# Patient Record
Sex: MALE | Race: WHITE | Employment: UNEMPLOYED | URBAN - METROPOLITAN AREA
[De-identification: names, ages, dates, MRNs, and addresses within clinical notes are randomized per-mention and may not be internally consistent; named-entity substitution may affect disease eponyms.]

---

## 2023-11-03 DIAGNOSIS — R21 RASH: Primary | ICD-10-CM

## 2023-11-03 RX ORDER — CLOTRIMAZOLE AND BETAMETHASONE DIPROPIONATE 10; .64 MG/G; MG/G
CREAM TOPICAL
Qty: 15 G | Refills: 1 | Status: SHIPPED | OUTPATIENT
Start: 2023-11-03

## 2023-11-13 DIAGNOSIS — N52.9 ERECTILE DYSFUNCTION, UNSPECIFIED ERECTILE DYSFUNCTION TYPE: ICD-10-CM

## 2023-11-13 DIAGNOSIS — G47.00 INSOMNIA, UNSPECIFIED: ICD-10-CM

## 2023-11-13 RX ORDER — ZOLPIDEM TARTRATE 10 MG/1
10 TABLET ORAL NIGHTLY
Qty: 30 TABLET | Refills: 2 | Status: SHIPPED | OUTPATIENT
Start: 2023-11-13 | End: 2024-02-15

## 2023-11-13 RX ORDER — SILDENAFIL 100 MG/1
TABLET, FILM COATED ORAL
Qty: 5 TABLET | Refills: 5 | Status: SHIPPED | OUTPATIENT
Start: 2023-11-13

## 2023-11-13 NOTE — TELEPHONE ENCOUNTER
Rx Refill Request Telephone Encounter    Name:  Kamari Zhao  :  [id number]  Medication Name: Zolpidem 10 mg            Specific Pharmacy location:   [facility]  Date of last appointment:    Date of next appointment:    Best number to reach patient:

## 2023-12-01 DIAGNOSIS — I10 HYPERTENSION, UNSPECIFIED TYPE: ICD-10-CM

## 2023-12-01 RX ORDER — HYDROCHLOROTHIAZIDE 50 MG/1
TABLET ORAL
COMMUNITY
Start: 2023-07-09 | End: 2023-12-28

## 2023-12-01 RX ORDER — METOPROLOL TARTRATE 100 MG/1
100 TABLET ORAL 2 TIMES DAILY
COMMUNITY
End: 2023-12-12 | Stop reason: SDUPTHER

## 2023-12-01 RX ORDER — HYDROCODONE BITARTRATE AND ACETAMINOPHEN 5; 325 MG/1; MG/1
TABLET ORAL EVERY 6 HOURS
COMMUNITY

## 2023-12-01 RX ORDER — ONDANSETRON 4 MG/1
TABLET, ORALLY DISINTEGRATING ORAL
COMMUNITY
Start: 2023-01-16

## 2023-12-01 RX ORDER — LOVASTATIN 10 MG/1
10 TABLET ORAL DAILY
COMMUNITY
End: 2024-02-29

## 2023-12-01 RX ORDER — METOPROLOL TARTRATE 100 MG/1
100 TABLET ORAL 2 TIMES DAILY
Qty: 180 TABLET | Refills: 2 | Status: SHIPPED | OUTPATIENT
Start: 2023-12-01

## 2023-12-01 RX ORDER — OXYCODONE HYDROCHLORIDE 5 MG/1
5 TABLET ORAL EVERY 6 HOURS PRN
COMMUNITY
Start: 2023-07-15

## 2023-12-04 ENCOUNTER — TELEPHONE (OUTPATIENT)
Dept: PRIMARY CARE | Facility: CLINIC | Age: 68
End: 2023-12-04
Payer: COMMERCIAL

## 2023-12-04 DIAGNOSIS — F32.A ANXIETY AND DEPRESSION: ICD-10-CM

## 2023-12-04 DIAGNOSIS — F41.9 ANXIETY AND DEPRESSION: ICD-10-CM

## 2023-12-04 RX ORDER — CLONAZEPAM 1 MG/1
1 TABLET ORAL 2 TIMES DAILY
COMMUNITY
End: 2023-12-04 | Stop reason: SDUPTHER

## 2023-12-04 RX ORDER — CLONAZEPAM 1 MG/1
1 TABLET ORAL 2 TIMES DAILY
Qty: 60 TABLET | Refills: 0 | Status: SHIPPED | OUTPATIENT
Start: 2023-12-04 | End: 2024-01-04

## 2023-12-04 NOTE — TELEPHONE ENCOUNTER
RX REFILL  Clonazepam 1 mg twice daily  Pharmacy- [facility] in [place] on [facility]

## 2023-12-04 NOTE — TELEPHONE ENCOUNTER
Rx refill  Clonazepam 1 mg take one tablet by mouth at bedtime  Pharmacy- [facility] [place] on [facility]

## 2023-12-06 ENCOUNTER — LAB (OUTPATIENT)
Dept: LAB | Facility: LAB | Age: 68
End: 2023-12-06
Payer: COMMERCIAL

## 2023-12-06 DIAGNOSIS — E11.9 TYPE 2 DIABETES MELLITUS WITHOUT COMPLICATIONS (MULTI): ICD-10-CM

## 2023-12-06 DIAGNOSIS — I10 ESSENTIAL (PRIMARY) HYPERTENSION: ICD-10-CM

## 2023-12-06 DIAGNOSIS — Z00.00 ENCOUNTER FOR GENERAL ADULT MEDICAL EXAMINATION WITHOUT ABNORMAL FINDINGS: Primary | ICD-10-CM

## 2023-12-06 DIAGNOSIS — Z12.5 ENCOUNTER FOR SCREENING FOR MALIGNANT NEOPLASM OF PROSTATE: ICD-10-CM

## 2023-12-06 DIAGNOSIS — E78.00 PURE HYPERCHOLESTEROLEMIA, UNSPECIFIED: ICD-10-CM

## 2023-12-06 LAB
ALBUMIN SERPL BCP-MCNC: 4.1 G/DL (ref 3.4–5)
ALP SERPL-CCNC: 44 U/L (ref 33–136)
ALT SERPL W P-5'-P-CCNC: 15 U/L (ref 10–52)
ANION GAP SERPL CALC-SCNC: 13 MMOL/L (ref 10–20)
APPEARANCE UR: CLEAR
AST SERPL W P-5'-P-CCNC: 15 U/L (ref 9–39)
BASOPHILS # BLD AUTO: 0.04 X10*3/UL (ref 0–0.1)
BASOPHILS NFR BLD AUTO: 0.9 %
BILIRUB SERPL-MCNC: 0.4 MG/DL (ref 0–1.2)
BILIRUB UR STRIP.AUTO-MCNC: NEGATIVE MG/DL
BUN SERPL-MCNC: 13 MG/DL (ref 6–23)
CALCIUM SERPL-MCNC: 8.9 MG/DL (ref 8.6–10.3)
CHLORIDE SERPL-SCNC: 103 MMOL/L (ref 98–107)
CHOLEST SERPL-MCNC: 121 MG/DL (ref 0–199)
CHOLESTEROL/HDL RATIO: 4.6
CO2 SERPL-SCNC: 27 MMOL/L (ref 21–32)
COLOR UR: YELLOW
CREAT SERPL-MCNC: 0.85 MG/DL (ref 0.5–1.3)
CREAT UR-MCNC: 102.4 MG/DL (ref 20–370)
EOSINOPHIL # BLD AUTO: 0.24 X10*3/UL (ref 0–0.7)
EOSINOPHIL NFR BLD AUTO: 5.5 %
ERYTHROCYTE [DISTWIDTH] IN BLOOD BY AUTOMATED COUNT: 13.5 % (ref 11.5–14.5)
EST. AVERAGE GLUCOSE BLD GHB EST-MCNC: 120 MG/DL
GFR SERPL CREATININE-BSD FRML MDRD: >90 ML/MIN/1.73M*2
GLUCOSE SERPL-MCNC: 98 MG/DL (ref 74–99)
GLUCOSE UR STRIP.AUTO-MCNC: NEGATIVE MG/DL
HBA1C MFR BLD: 5.8 %
HCT VFR BLD AUTO: 43.1 % (ref 41–52)
HDLC SERPL-MCNC: 26.5 MG/DL
HGB BLD-MCNC: 13.9 G/DL (ref 13.5–17.5)
IMM GRANULOCYTES # BLD AUTO: 0.01 X10*3/UL (ref 0–0.7)
IMM GRANULOCYTES NFR BLD AUTO: 0.2 % (ref 0–0.9)
KETONES UR STRIP.AUTO-MCNC: NEGATIVE MG/DL
LDLC SERPL CALC-MCNC: 77 MG/DL
LEUKOCYTE ESTERASE UR QL STRIP.AUTO: NEGATIVE
LYMPHOCYTES # BLD AUTO: 2.18 X10*3/UL (ref 1.2–4.8)
LYMPHOCYTES NFR BLD AUTO: 49.5 %
MCH RBC QN AUTO: 30.8 PG (ref 26–34)
MCHC RBC AUTO-ENTMCNC: 32.3 G/DL (ref 32–36)
MCV RBC AUTO: 96 FL (ref 80–100)
MICROALBUMIN UR-MCNC: 13.4 MG/L
MICROALBUMIN/CREAT UR: 13.1 UG/MG CREAT
MONOCYTES # BLD AUTO: 0.32 X10*3/UL (ref 0.1–1)
MONOCYTES NFR BLD AUTO: 7.3 %
NEUTROPHILS # BLD AUTO: 1.61 X10*3/UL (ref 1.2–7.7)
NEUTROPHILS NFR BLD AUTO: 36.6 %
NITRITE UR QL STRIP.AUTO: NEGATIVE
NON HDL CHOLESTEROL: 95 MG/DL (ref 0–149)
NRBC BLD-RTO: 0 /100 WBCS (ref 0–0)
PH UR STRIP.AUTO: 6 [PH]
PLATELET # BLD AUTO: 155 X10*3/UL (ref 150–450)
POTASSIUM SERPL-SCNC: 3.8 MMOL/L (ref 3.5–5.3)
PROT SERPL-MCNC: 7.9 G/DL (ref 6.4–8.2)
PROT UR STRIP.AUTO-MCNC: NEGATIVE MG/DL
PSA SERPL-MCNC: 0.8 NG/ML
RBC # BLD AUTO: 4.51 X10*6/UL (ref 4.5–5.9)
RBC # UR STRIP.AUTO: NEGATIVE /UL
SODIUM SERPL-SCNC: 139 MMOL/L (ref 136–145)
SP GR UR STRIP.AUTO: 1.02
TRIGL SERPL-MCNC: 87 MG/DL (ref 0–149)
UROBILINOGEN UR STRIP.AUTO-MCNC: <2 MG/DL
VLDL: 17 MG/DL (ref 0–40)
WBC # BLD AUTO: 4.4 X10*3/UL (ref 4.4–11.3)

## 2023-12-06 PROCEDURE — 84153 ASSAY OF PSA TOTAL: CPT

## 2023-12-06 PROCEDURE — 36415 COLL VENOUS BLD VENIPUNCTURE: CPT

## 2023-12-06 PROCEDURE — 82043 UR ALBUMIN QUANTITATIVE: CPT

## 2023-12-06 PROCEDURE — 82570 ASSAY OF URINE CREATININE: CPT

## 2023-12-06 PROCEDURE — 83036 HEMOGLOBIN GLYCOSYLATED A1C: CPT

## 2023-12-12 ENCOUNTER — OFFICE VISIT (OUTPATIENT)
Dept: PRIMARY CARE | Facility: CLINIC | Age: 68
End: 2023-12-12
Payer: COMMERCIAL

## 2023-12-12 ENCOUNTER — TELEPHONE (OUTPATIENT)
Dept: PRIMARY CARE | Facility: CLINIC | Age: 68
End: 2023-12-12

## 2023-12-12 VITALS
OXYGEN SATURATION: 96 % | HEIGHT: 75 IN | SYSTOLIC BLOOD PRESSURE: 132 MMHG | WEIGHT: 264 LBS | BODY MASS INDEX: 32.83 KG/M2 | HEART RATE: 51 BPM | DIASTOLIC BLOOD PRESSURE: 74 MMHG | RESPIRATION RATE: 16 BRPM

## 2023-12-12 DIAGNOSIS — R73.01 IMPAIRED FASTING GLUCOSE: ICD-10-CM

## 2023-12-12 DIAGNOSIS — I25.10 ATHEROSCLEROSIS OF NATIVE CORONARY ARTERY OF NATIVE HEART WITHOUT ANGINA PECTORIS: Primary | ICD-10-CM

## 2023-12-12 DIAGNOSIS — I10 BENIGN ESSENTIAL HTN: ICD-10-CM

## 2023-12-12 DIAGNOSIS — F41.1 GENERALIZED ANXIETY DISORDER: ICD-10-CM

## 2023-12-12 DIAGNOSIS — E78.00 HYPERCHOLESTEROLEMIA: ICD-10-CM

## 2023-12-12 PROBLEM — Z00.00 WELL ADULT EXAM: Status: ACTIVE | Noted: 2023-12-12

## 2023-12-12 PROBLEM — K21.9 GASTROESOPHAGEAL REFLUX DISEASE WITHOUT ESOPHAGITIS: Status: ACTIVE | Noted: 2023-12-12

## 2023-12-12 PROCEDURE — 3075F SYST BP GE 130 - 139MM HG: CPT | Performed by: FAMILY MEDICINE

## 2023-12-12 PROCEDURE — 1125F AMNT PAIN NOTED PAIN PRSNT: CPT | Performed by: FAMILY MEDICINE

## 2023-12-12 PROCEDURE — 99213 OFFICE O/P EST LOW 20 MIN: CPT | Performed by: FAMILY MEDICINE

## 2023-12-12 PROCEDURE — 3078F DIAST BP <80 MM HG: CPT | Performed by: FAMILY MEDICINE

## 2023-12-12 PROCEDURE — 1036F TOBACCO NON-USER: CPT | Performed by: FAMILY MEDICINE

## 2023-12-12 PROCEDURE — G0402 INITIAL PREVENTIVE EXAM: HCPCS | Performed by: FAMILY MEDICINE

## 2023-12-12 PROCEDURE — 1159F MED LIST DOCD IN RCRD: CPT | Performed by: FAMILY MEDICINE

## 2023-12-12 RX ORDER — ASPIRIN 81 MG/1
81 TABLET ORAL
COMMUNITY
Start: 2018-09-05

## 2023-12-12 ASSESSMENT — ENCOUNTER SYMPTOMS
DEPRESSION: 0
LOSS OF SENSATION IN FEET: 0
OCCASIONAL FEELINGS OF UNSTEADINESS: 0

## 2023-12-12 ASSESSMENT — PATIENT HEALTH QUESTIONNAIRE - PHQ9
1. LITTLE INTEREST OR PLEASURE IN DOING THINGS: NOT AT ALL
SUM OF ALL RESPONSES TO PHQ9 QUESTIONS 1 AND 2: 0
2. FEELING DOWN, DEPRESSED OR HOPELESS: NOT AT ALL

## 2023-12-12 ASSESSMENT — PAIN SCALES - GENERAL: PAINLEVEL: 10-WORST PAIN EVER

## 2023-12-12 NOTE — PROGRESS NOTES
Subjective   Patient ID: Roshan Zhao is a 67 y.o. male who presents for Annual Exam.    HPI   Presents for annual wellness visit.  PMH, FMH, SH and medication list reviewed and updated in chart.  PHQ-2 reviewed.  Mini cog test reviewed.  Advance care planning reviewed.        Self-assessment of health - good        Issues with ADLs - 0        Issues with IADLs - 0        Issues with home safety - 0  1. ROSHAN is seen for for his comprehensive physical exam. PMH, PSH, family history and social history were reviewed and updated.  Colonoscopy in 12/19 by Dr. Schwartz showed 2 benign polyps. He was told to repeat in 2024.     2. ROSHAN is here also for follow up of benign essential hypertension.  He is on Toprol 200 mg, HCTZ 50 mg. and Aceon 4 mg (ACE inhibitor).     3. ROSHAN is seen today also for follow up of High cholesterol.  He is on lovastatin. Recent  LDL is 77.     4. ROSHAN is seen today for follow-up of coronary artery disease.  He is S/P CABG in 2001 and is followed by Dr. Cordero.     5. He is also seen in follow up for insomnia. He is taking two tablets of Klonopin 1 mg and one 10 mg Ambien tablet at night.     6. ROSHAN is seen for also for follow up for GERD.  He takes PRN OTC medication once in a while.      7.   He is also here for elevated glucose.  No history of diabetes.  Recent A1c is 5.8.  He is on no medication.    8.  He is S/P Sigmoid resection in 2023 at [place] due to abscess diverticulitis.  He is doing well and no longer needs to see the surgeon for follow-ups.    Review of Systems  Constitutional symptoms: No fever, loss of appetite, headaches, fatigue.  Eyes: Negative for vision loss or blurred or double vision.  ENT: Negative for hearing loss, tinnitus, nasal congestion, rhinorrhea, nosebleeds, teeth problems, mouth sores, sore throat or dysphagia.  Cardiovascular: Negative for chest pain/pressure, palpitations, edema, claudication.  Respiratory: Negative for shortness of breath,  "dyspnea on exertion, pain with breathing or coughing.  Breast: Negative for tenderness, masses, gynecomastia or discharge.  Gastrointestinal: Negative for anorexia, nausea, vomiting, indigestion, pain, change in bowel habits or blood in stool.  Musculoskeletal: Negative for joint pain, joint swelling, myalgias or cramps.  Skin: Negative for rash, changing skin lesions.  Neurological: Negative for headache, numbness, tingling, weakness or tremors.  Psychiatric: Negative for depression or anxiety.  Endocrine: Negative for marked change in weight, heat or cold intolerance, polyuria, polydipsia or polyphagia.  Hematologic: Negative for bruising or abnormal bleeding.    Objective   /74   Pulse 51   Resp 16   Ht 1.905 m (6' 3\")   Wt 120 kg (264 lb)   SpO2 96%   BMI 33.00 kg/m²     Physical Exam  General appearance: Vital signs have been reviewed.  Comfortable.  Well-nourished and well-developed.He is alert and oriented x3 and appears his stated age.The patient is cooperative with exam.  Head: Hair pattern reveals a normal pattern for patient's age and face shows no abnormalities.  Eyes: PERRLA x2, EOMI x2, conjunctive a and sclera are clear.  Ears: External bilateral ears with normal helix, tragus and earlobe.Bilateral canals are normal.Bilateral tympanic membranes are pearly gray and landmarks are well visualized.  Nose: Nasal mucosa both nostrils reveals no polyps, ulcerations or lesions.  Throat:Teeth are in good repair.  Posterior pharynx reveals no abnormalities.  Neck: Supple without lymphadenopathy, thyromegaly or carotid bruits.  Lungs:Clear to auscultation bilaterally with no wheezes, rales or rhonchi.  Cardiovascular: RRR without MRG.No carotid bruits.  Extremities without edema and pulses are intact.  Abdomen: Soft, NT, no masses, no hepatosplenomegaly.  Genitalia: No testicular masses.  No evidence of renal hernia.Nontender.  Rectal: No masses.  Prostate is normal in size and shape with no nodules. " It is nontender.  Musculoskeletal: 5/5 and equal strength in bilateral upper and lower extremities.  Skin: Good turgor and without rashes.  Neurological: Good overall strength and no focal deficits.  Cranial nerves II through XII are grossly intact.  Psychiatric: Patient has appropriate judgment with good insight.  Mood is appropriate.    Assessment/Plan   Problem List Items Addressed This Visit             ICD-10-CM       High    Generalized anxiety disorder F41.1    Atherosclerosis of coronary artery without angina pectoris - Primary I25.10     Continue current medication, following with Dr. Cordero.         Benign essential HTN I10     Continue current medications.  Recheck in 6 months.           Hypercholesterolemia E78.00     Continue current medications.  Recheck in 6 months.           Impaired fasting glucose R73.01     Keep off medication.  Continue to cut back on carbohydrates.  Recheck in 6 months.                L CVA tenderness, suprapubic tenderness to palpation

## 2023-12-28 DIAGNOSIS — I10 BENIGN ESSENTIAL HTN: ICD-10-CM

## 2023-12-28 RX ORDER — PERINDOPRIL ERBUMINE 4 MG/1
4 TABLET ORAL DAILY
Qty: 90 TABLET | Refills: 1 | Status: SHIPPED | OUTPATIENT
Start: 2023-12-28

## 2023-12-28 RX ORDER — HYDROCHLOROTHIAZIDE 50 MG/1
50 TABLET ORAL DAILY
Qty: 90 TABLET | Refills: 1 | Status: SHIPPED | OUTPATIENT
Start: 2023-12-28 | End: 2024-06-07

## 2024-01-03 DIAGNOSIS — F32.A ANXIETY AND DEPRESSION: ICD-10-CM

## 2024-01-03 DIAGNOSIS — F41.9 ANXIETY AND DEPRESSION: ICD-10-CM

## 2024-01-04 ENCOUNTER — TELEPHONE (OUTPATIENT)
Dept: PRIMARY CARE | Facility: CLINIC | Age: 69
End: 2024-01-04
Payer: COMMERCIAL

## 2024-01-04 DIAGNOSIS — F41.9 ANXIETY AND DEPRESSION: ICD-10-CM

## 2024-01-04 DIAGNOSIS — F32.A ANXIETY AND DEPRESSION: ICD-10-CM

## 2024-01-04 RX ORDER — CLONAZEPAM 1 MG/1
1 TABLET ORAL 2 TIMES DAILY
Qty: 60 TABLET | Refills: 0 | OUTPATIENT
Start: 2024-01-04

## 2024-01-04 RX ORDER — CLONAZEPAM 1 MG/1
1 TABLET ORAL 2 TIMES DAILY
Qty: 60 TABLET | Refills: 0 | Status: SHIPPED | OUTPATIENT
Start: 2024-01-04 | End: 2024-01-31

## 2024-01-31 DIAGNOSIS — F32.A ANXIETY AND DEPRESSION: ICD-10-CM

## 2024-01-31 DIAGNOSIS — F41.9 ANXIETY AND DEPRESSION: ICD-10-CM

## 2024-01-31 RX ORDER — CLONAZEPAM 1 MG/1
1 TABLET ORAL 2 TIMES DAILY
Qty: 60 TABLET | Refills: 0 | Status: SHIPPED | OUTPATIENT
Start: 2024-02-02 | End: 2024-03-04

## 2024-02-01 ENCOUNTER — TELEPHONE (OUTPATIENT)
Dept: PRIMARY CARE | Facility: CLINIC | Age: 69
End: 2024-02-01
Payer: COMMERCIAL

## 2024-02-01 DIAGNOSIS — F41.9 ANXIETY AND DEPRESSION: ICD-10-CM

## 2024-02-01 DIAGNOSIS — F32.A ANXIETY AND DEPRESSION: ICD-10-CM

## 2024-02-01 RX ORDER — CLONAZEPAM 1 MG/1
1 TABLET ORAL 2 TIMES DAILY
Qty: 60 TABLET | Refills: 0 | OUTPATIENT
Start: 2024-02-02 | End: 2024-03-03

## 2024-02-14 DIAGNOSIS — G47.00 INSOMNIA, UNSPECIFIED: ICD-10-CM

## 2024-02-15 RX ORDER — ZOLPIDEM TARTRATE 10 MG/1
10 TABLET ORAL NIGHTLY
Qty: 30 TABLET | Refills: 2 | Status: SHIPPED | OUTPATIENT
Start: 2024-02-15 | End: 2024-05-15 | Stop reason: SDUPTHER

## 2024-02-15 NOTE — TELEPHONE ENCOUNTER
PT called for refill of zolpidem 10 MG.   Pharmacy is [facility] telephone number for PT is [phone].

## 2024-02-29 DIAGNOSIS — E78.00 HYPERCHOLESTEROLEMIA: ICD-10-CM

## 2024-02-29 RX ORDER — LOVASTATIN 10 MG/1
10 TABLET ORAL DAILY
Qty: 90 TABLET | Refills: 1 | Status: SHIPPED | OUTPATIENT
Start: 2024-02-29

## 2024-03-02 DIAGNOSIS — F41.9 ANXIETY AND DEPRESSION: ICD-10-CM

## 2024-03-02 DIAGNOSIS — F32.A ANXIETY AND DEPRESSION: ICD-10-CM

## 2024-03-04 RX ORDER — CLONAZEPAM 1 MG/1
1 TABLET ORAL 2 TIMES DAILY
Qty: 60 TABLET | Refills: 0 | Status: SHIPPED | OUTPATIENT
Start: 2024-03-04 | End: 2024-04-03

## 2024-04-02 DIAGNOSIS — F41.9 ANXIETY AND DEPRESSION: ICD-10-CM

## 2024-04-02 DIAGNOSIS — F32.A ANXIETY AND DEPRESSION: ICD-10-CM

## 2024-04-03 RX ORDER — CLONAZEPAM 1 MG/1
1 TABLET ORAL 2 TIMES DAILY
Qty: 60 TABLET | Refills: 0 | Status: SHIPPED | OUTPATIENT
Start: 2024-04-03 | End: 2024-05-01

## 2024-05-01 DIAGNOSIS — F41.9 ANXIETY AND DEPRESSION: ICD-10-CM

## 2024-05-01 DIAGNOSIS — F32.A ANXIETY AND DEPRESSION: ICD-10-CM

## 2024-05-01 RX ORDER — CLONAZEPAM 1 MG/1
1 TABLET ORAL 2 TIMES DAILY
Qty: 60 TABLET | Refills: 0 | Status: SHIPPED | OUTPATIENT
Start: 2024-05-01 | End: 2024-06-03 | Stop reason: SDUPTHER

## 2024-05-15 ENCOUNTER — TELEPHONE (OUTPATIENT)
Dept: PRIMARY CARE | Facility: CLINIC | Age: 69
End: 2024-05-15
Payer: COMMERCIAL

## 2024-05-15 DIAGNOSIS — G47.00 INSOMNIA, UNSPECIFIED: ICD-10-CM

## 2024-05-15 RX ORDER — ZOLPIDEM TARTRATE 10 MG/1
10 TABLET ORAL NIGHTLY
Qty: 30 TABLET | Refills: 0 | Status: SHIPPED | OUTPATIENT
Start: 2024-05-15

## 2024-05-15 NOTE — TELEPHONE ENCOUNTER
Rx Refill Request Telephone Encounter    Name:  Kamari Zhao  :  [id number]  Medication Name:  Zolpidem 10 mg he is out, told him JAT is out            Specific Pharmacy location:  [facility]  Date of last appointment:    Date of next appointment:    Best number to reach patient:

## 2024-06-03 ENCOUNTER — TELEPHONE (OUTPATIENT)
Dept: PRIMARY CARE | Facility: CLINIC | Age: 69
End: 2024-06-03
Payer: COMMERCIAL

## 2024-06-03 DIAGNOSIS — F32.A ANXIETY AND DEPRESSION: ICD-10-CM

## 2024-06-03 DIAGNOSIS — F41.9 ANXIETY AND DEPRESSION: ICD-10-CM

## 2024-06-03 RX ORDER — CLONAZEPAM 1 MG/1
1 TABLET ORAL 2 TIMES DAILY
Qty: 60 TABLET | Refills: 0 | Status: SHIPPED | OUTPATIENT
Start: 2024-06-03

## 2024-06-03 NOTE — TELEPHONE ENCOUNTER
Rx Refill Request Telephone Encounter    Name:  Kamari Zhao  :  [id number]  Medication Name:  Clonazepam, 1 mg he is out             Specific Pharmacy location:  [facility]  Date of last appointment:    Date of next appointment:    Best number to reach patient:

## 2024-06-07 DIAGNOSIS — I10 BENIGN ESSENTIAL HTN: ICD-10-CM

## 2024-06-07 RX ORDER — HYDROCHLOROTHIAZIDE 50 MG/1
50 TABLET ORAL DAILY
Qty: 90 TABLET | Refills: 1 | Status: SHIPPED | OUTPATIENT
Start: 2024-06-07

## 2024-06-12 DIAGNOSIS — G47.00 INSOMNIA, UNSPECIFIED: ICD-10-CM

## 2024-06-12 RX ORDER — ZOLPIDEM TARTRATE 10 MG/1
10 TABLET ORAL NIGHTLY
Qty: 30 TABLET | Refills: 2 | Status: SHIPPED | OUTPATIENT
Start: 2024-06-12

## 2024-06-14 PROBLEM — E87.1 HYPONATREMIA: Status: ACTIVE | Noted: 2024-06-14

## 2024-06-14 PROBLEM — A04.72 CLOSTRIDIUM DIFFICILE COLITIS: Status: RESOLVED | Noted: 2023-07-31 | Resolved: 2024-06-14

## 2024-06-14 PROBLEM — E87.6 HYPOKALEMIA: Status: ACTIVE | Noted: 2024-06-14

## 2024-06-14 PROBLEM — E66.811 OBESITY, CLASS I, BMI 30-34.9: Status: ACTIVE | Noted: 2023-07-12

## 2024-06-14 PROBLEM — S93.401A RIGHT ANKLE SPRAIN: Status: RESOLVED | Noted: 2019-09-23 | Resolved: 2024-06-14

## 2024-06-14 PROBLEM — N52.9 ERECTILE DYSFUNCTION: Status: RESOLVED | Noted: 2023-05-25 | Resolved: 2024-06-14

## 2024-06-14 PROBLEM — K57.90 DIVERTICULOSIS: Status: ACTIVE | Noted: 2023-07-13

## 2024-06-14 PROBLEM — G47.00 INSOMNIA: Status: RESOLVED | Noted: 2020-03-20 | Resolved: 2024-06-14

## 2024-06-14 PROBLEM — K57.20 DIVERTICULITIS OF LARGE INTESTINE WITH PERFORATION AND ABSCESS WITHOUT BLEEDING: Status: ACTIVE | Noted: 2023-07-12

## 2024-06-14 PROBLEM — I50.9 HEART FAILURE (MULTI): Status: ACTIVE | Noted: 2024-06-14

## 2024-06-14 PROBLEM — E43 SEVERE PROTEIN-CALORIE MALNUTRITION (MULTI): Status: ACTIVE | Noted: 2023-07-29

## 2024-06-14 PROBLEM — R20.0 NUMBNESS IN FEET: Status: RESOLVED | Noted: 2020-11-11 | Resolved: 2024-06-14

## 2024-06-14 PROBLEM — R10.9 ABDOMINAL PAIN: Status: RESOLVED | Noted: 2023-02-05 | Resolved: 2024-06-14

## 2024-06-14 PROBLEM — K57.20 COLONIC DIVERTICULAR ABSCESS: Status: RESOLVED | Noted: 2024-06-14 | Resolved: 2024-06-14

## 2024-06-14 PROBLEM — K57.92 DIVERTICULITIS: Status: ACTIVE | Noted: 2024-06-14

## 2024-06-14 PROBLEM — E11.9 DIABETES MELLITUS (MULTI): Status: ACTIVE | Noted: 2022-11-16

## 2024-06-14 PROBLEM — K57.32 DIVERTICULITIS OF SIGMOID COLON: Status: ACTIVE | Noted: 2024-06-14

## 2024-06-14 PROBLEM — K21.00 GASTROESOPHAGEAL REFLUX DISEASE WITH ESOPHAGITIS: Status: ACTIVE | Noted: 2018-09-05

## 2024-06-14 PROBLEM — U07.1 COVID-19 VIRUS INFECTION: Status: RESOLVED | Noted: 2022-08-22 | Resolved: 2024-06-14

## 2024-06-14 PROBLEM — R00.2 PALPITATIONS: Status: RESOLVED | Noted: 2024-06-14 | Resolved: 2024-06-14

## 2024-06-14 PROBLEM — I25.10 ARTERIOSCLEROSIS OF CORONARY ARTERY: Status: RESOLVED | Noted: 2018-09-05 | Resolved: 2024-06-14

## 2024-06-14 PROBLEM — R19.7 DIARRHEA: Status: ACTIVE | Noted: 2023-07-28

## 2024-06-14 PROBLEM — Z90.49 S/P LAPAROSCOPIC-ASSISTED SIGMOIDECTOMY: Status: ACTIVE | Noted: 2023-07-14

## 2024-06-14 PROBLEM — Z95.1 S/P CORONARY ARTERY BYPASS GRAFT X 3: Status: ACTIVE | Noted: 2024-06-14

## 2024-06-14 PROBLEM — E66.9 OBESITY, CLASS I, BMI 30-34.9: Status: ACTIVE | Noted: 2023-07-12

## 2024-06-14 PROBLEM — I11.9 BENIGN HYPERTENSIVE HEART DISEASE: Status: ACTIVE | Noted: 2023-07-09

## 2024-06-20 ENCOUNTER — LAB (OUTPATIENT)
Dept: LAB | Facility: LAB | Age: 69
End: 2024-06-20
Payer: MEDICARE

## 2024-06-20 ENCOUNTER — APPOINTMENT (OUTPATIENT)
Dept: PRIMARY CARE | Facility: CLINIC | Age: 69
End: 2024-06-20
Payer: MEDICARE

## 2024-06-20 ENCOUNTER — TELEPHONE (OUTPATIENT)
Dept: PRIMARY CARE | Facility: CLINIC | Age: 69
End: 2024-06-20

## 2024-06-20 VITALS
WEIGHT: 242 LBS | OXYGEN SATURATION: 98 % | HEART RATE: 99 BPM | DIASTOLIC BLOOD PRESSURE: 84 MMHG | BODY MASS INDEX: 30.25 KG/M2 | SYSTOLIC BLOOD PRESSURE: 130 MMHG

## 2024-06-20 DIAGNOSIS — R21 RASH: ICD-10-CM

## 2024-06-20 DIAGNOSIS — R73.01 IMPAIRED FASTING GLUCOSE: ICD-10-CM

## 2024-06-20 DIAGNOSIS — I10 BENIGN ESSENTIAL HTN: ICD-10-CM

## 2024-06-20 DIAGNOSIS — E78.00 HYPERCHOLESTEROLEMIA: ICD-10-CM

## 2024-06-20 DIAGNOSIS — Z12.5 SCREENING FOR PROSTATE CANCER: ICD-10-CM

## 2024-06-20 DIAGNOSIS — E78.00 HYPERCHOLESTEROLEMIA: Primary | ICD-10-CM

## 2024-06-20 LAB
ALBUMIN SERPL-MCNC: 4.3 G/DL (ref 3.5–5)
ALP BLD-CCNC: 66 U/L (ref 35–125)
ALT SERPL-CCNC: 11 U/L (ref 5–40)
ANION GAP SERPL CALC-SCNC: 10 MMOL/L
AST SERPL-CCNC: 14 U/L (ref 5–40)
BILIRUB SERPL-MCNC: 0.4 MG/DL (ref 0.1–1.2)
BUN SERPL-MCNC: 19 MG/DL (ref 8–25)
CALCIUM SERPL-MCNC: 9.5 MG/DL (ref 8.5–10.4)
CHLORIDE SERPL-SCNC: 99 MMOL/L (ref 97–107)
CHOLEST SERPL-MCNC: 111 MG/DL (ref 133–200)
CHOLEST/HDLC SERPL: 4 {RATIO}
CO2 SERPL-SCNC: 28 MMOL/L (ref 24–31)
CREAT SERPL-MCNC: 1.1 MG/DL (ref 0.4–1.6)
EGFRCR SERPLBLD CKD-EPI 2021: 74 ML/MIN/1.73M*2
EST. AVERAGE GLUCOSE BLD GHB EST-MCNC: 114 MG/DL
GLUCOSE P FAST SERPL-MCNC: 110 MG/DL (ref 69–99)
GLUCOSE SERPL-MCNC: 110 MG/DL (ref 65–99)
HBA1C MFR BLD: 5.6 %
HDLC SERPL-MCNC: 28 MG/DL
LDLC SERPL CALC-MCNC: 68 MG/DL (ref 65–130)
POC HEMOGLOBIN A1C: 5.7 % (ref 4.2–6.5)
POTASSIUM SERPL-SCNC: 4.2 MMOL/L (ref 3.4–5.1)
PROT SERPL-MCNC: 8.2 G/DL (ref 5.9–7.9)
SODIUM SERPL-SCNC: 137 MMOL/L (ref 133–145)
TRIGL SERPL-MCNC: 77 MG/DL (ref 40–150)

## 2024-06-20 PROCEDURE — 1158F ADVNC CARE PLAN TLK DOCD: CPT | Performed by: FAMILY MEDICINE

## 2024-06-20 PROCEDURE — 83036 HEMOGLOBIN GLYCOSYLATED A1C: CPT

## 2024-06-20 PROCEDURE — 99214 OFFICE O/P EST MOD 30 MIN: CPT | Performed by: FAMILY MEDICINE

## 2024-06-20 PROCEDURE — 4010F ACE/ARB THERAPY RXD/TAKEN: CPT | Performed by: FAMILY MEDICINE

## 2024-06-20 PROCEDURE — 80053 COMPREHEN METABOLIC PANEL: CPT

## 2024-06-20 PROCEDURE — 3075F SYST BP GE 130 - 139MM HG: CPT | Performed by: FAMILY MEDICINE

## 2024-06-20 PROCEDURE — 83036 HEMOGLOBIN GLYCOSYLATED A1C: CPT | Performed by: FAMILY MEDICINE

## 2024-06-20 PROCEDURE — 1126F AMNT PAIN NOTED NONE PRSNT: CPT | Performed by: FAMILY MEDICINE

## 2024-06-20 PROCEDURE — 82947 ASSAY GLUCOSE BLOOD QUANT: CPT

## 2024-06-20 PROCEDURE — 36415 COLL VENOUS BLD VENIPUNCTURE: CPT

## 2024-06-20 PROCEDURE — 1036F TOBACCO NON-USER: CPT | Performed by: FAMILY MEDICINE

## 2024-06-20 PROCEDURE — 1159F MED LIST DOCD IN RCRD: CPT | Performed by: FAMILY MEDICINE

## 2024-06-20 PROCEDURE — 1123F ACP DISCUSS/DSCN MKR DOCD: CPT | Performed by: FAMILY MEDICINE

## 2024-06-20 PROCEDURE — 3079F DIAST BP 80-89 MM HG: CPT | Performed by: FAMILY MEDICINE

## 2024-06-20 PROCEDURE — 80061 LIPID PANEL: CPT

## 2024-06-20 RX ORDER — FLUOCINONIDE 0.5 MG/G
CREAM TOPICAL 2 TIMES DAILY
COMMUNITY
End: 2024-06-20 | Stop reason: SDUPTHER

## 2024-06-20 RX ORDER — FLUOCINONIDE 0.5 MG/G
CREAM TOPICAL 2 TIMES DAILY
Qty: 60 G | Refills: 1 | Status: SHIPPED | OUTPATIENT
Start: 2024-06-20

## 2024-06-20 ASSESSMENT — PATIENT HEALTH QUESTIONNAIRE - PHQ9
1. LITTLE INTEREST OR PLEASURE IN DOING THINGS: NOT AT ALL
2. FEELING DOWN, DEPRESSED OR HOPELESS: NOT AT ALL
SUM OF ALL RESPONSES TO PHQ9 QUESTIONS 1 AND 2: 0

## 2024-06-20 ASSESSMENT — PAIN SCALES - GENERAL: PAINLEVEL: 0-NO PAIN

## 2024-06-20 NOTE — ASSESSMENT & PLAN NOTE
Will return for fasting lipid panel.  If stable continue lovastatin and recheck in 6 months at [facility].

## 2024-06-20 NOTE — ASSESSMENT & PLAN NOTE
Will return for fasting blood work including A1c.  If stable keep off medication and recheck in 6 months at [facility].

## 2024-06-20 NOTE — ASSESSMENT & PLAN NOTE
This appears to be a similar reaction that he gets in his arms to the sun.  Will refill fluocinonide.  He is to use sunscreen and cut back on sun exposure is much as possible in the future.  If no improvement after the medication he is to let me know.  That time would refer back to Dr. Valente.

## 2024-06-20 NOTE — PROGRESS NOTES
Subjective   Patient ID: Roshan Zhao is a 68 y.o. male who presents for Follow-up (Patient did not get labs done ).    HPI     1. ROSHAN is here for follow up of benign essential hypertension.  He is on Toprol 200 mg, HCTZ 50 mg. and Aceon 4 mg (ACE inhibitor).     2. ROSHAN is seen today also for follow up of High cholesterol.  He is on lovastatin.      3. ROSHAN is seen today for follow-up of coronary artery disease.  He is S/P CABG in 2001 and is followed by Dr. Cordero.     4. He is also seen in follow up for insomnia. He is taking two tablets of Klonopin 1 mg and one 10 mg Ambien tablet at night.     5. ROSHAN is seen for also for follow up for GERD.  He takes PRN OTC medication once in a while.      6.   He is also here for elevated glucose.  No history of diabetes.   He is on no medication.    7. He is also here for rash on his legs.  He is chronically had a similar rash on his forearms.  He had seen dermatologist Dr. Valente in the past and felt it was due to a reaction to the sun.  If he puts his sun screen on it it does not occur if it does occur despite this he uses prescription Fluocinonide  This year he did not put sunscreen on his legs any developed a similar rash.  He is out of the Fluocinonide.    Review of Systems  Denies headache, blurred vision, chest pain, shortness of breath, nausea or vomiting, change in bowel habits or leg pain or swelling.    Objective   /84 (BP Location: Left arm)   Pulse 99   Wt 110 kg (242 lb)   SpO2 98%   BMI 30.25 kg/m²     Physical Exam  Vitals and nurse's notes reviewed  General: no acute distress  HEENT: Normal  Neck: Supple  Lungs: Clear  Cardio: RRR w/o murmur  Extremities: No edema, no calf tenderness. Papular erythematous rash over both shins.  No excoriations or signs of infection.  Neuro: Alert and oriented with no focal deficits    Assessment/Plan   Problem List Items Addressed This Visit             ICD-10-CM       High    Benign essential  HTN I10     Continue Toprol and ACN.  Continue follow-up with cardiologist.  Follow-up here in 6 months.         Hypercholesterolemia - Primary E78.00     Will return for fasting lipid panel.  If stable continue lovastatin and recheck in 6 months at [facility].         Impaired fasting glucose R73.01     Will return for fasting blood work including A1c.  If stable keep off medication and recheck in 6 months at [facility].         Relevant Orders    POCT glycosylated hemoglobin (Hb A1C) manually resulted (Completed)       Medium    Rash R21     This appears to be a similar reaction that he gets in his arms to the sun.  Will refill fluocinonide.  He is to use sunscreen and cut back on sun exposure is much as possible in the future.  If no improvement after the medication he is to let me know.  That time would refer back to Dr. Valente.         Relevant Medications    fluocinonide (Lidex) 0.05 % cream

## 2024-07-01 DIAGNOSIS — F32.A ANXIETY AND DEPRESSION: ICD-10-CM

## 2024-07-01 DIAGNOSIS — F41.9 ANXIETY AND DEPRESSION: ICD-10-CM

## 2024-07-02 RX ORDER — CLONAZEPAM 1 MG/1
1 TABLET ORAL 2 TIMES DAILY
Qty: 60 TABLET | Refills: 0 | Status: SHIPPED | OUTPATIENT
Start: 2024-07-02

## 2024-07-29 DIAGNOSIS — I10 BENIGN ESSENTIAL HTN: ICD-10-CM

## 2024-07-29 RX ORDER — PERINDOPRIL ERBUMINE 4 MG/1
4 TABLET ORAL DAILY
Qty: 90 TABLET | Refills: 1 | Status: SHIPPED | OUTPATIENT
Start: 2024-07-29

## 2024-07-31 DIAGNOSIS — F32.A ANXIETY AND DEPRESSION: ICD-10-CM

## 2024-07-31 DIAGNOSIS — F41.9 ANXIETY AND DEPRESSION: ICD-10-CM

## 2024-08-01 RX ORDER — CLONAZEPAM 1 MG/1
1 TABLET ORAL 2 TIMES DAILY
Qty: 60 TABLET | Refills: 0 | Status: SHIPPED | OUTPATIENT
Start: 2024-08-01

## 2024-08-29 DIAGNOSIS — I10 HYPERTENSION, UNSPECIFIED TYPE: ICD-10-CM

## 2024-08-29 DIAGNOSIS — E78.00 HYPERCHOLESTEROLEMIA: ICD-10-CM

## 2024-08-29 RX ORDER — LOVASTATIN 10 MG/1
10 TABLET ORAL DAILY
Qty: 90 TABLET | Refills: 1 | Status: SHIPPED | OUTPATIENT
Start: 2024-08-29

## 2024-08-29 RX ORDER — METOPROLOL TARTRATE 100 MG/1
100 TABLET ORAL 2 TIMES DAILY
Qty: 180 TABLET | Refills: 2 | Status: SHIPPED | OUTPATIENT
Start: 2024-08-29

## 2024-08-31 DIAGNOSIS — F32.A ANXIETY AND DEPRESSION: ICD-10-CM

## 2024-08-31 DIAGNOSIS — F41.9 ANXIETY AND DEPRESSION: ICD-10-CM

## 2024-09-03 ENCOUNTER — TELEPHONE (OUTPATIENT)
Dept: PRIMARY CARE | Facility: CLINIC | Age: 69
End: 2024-09-03
Payer: MEDICARE

## 2024-09-03 DIAGNOSIS — F41.9 ANXIETY AND DEPRESSION: ICD-10-CM

## 2024-09-03 DIAGNOSIS — F32.A ANXIETY AND DEPRESSION: ICD-10-CM

## 2024-09-03 RX ORDER — CLONAZEPAM 1 MG/1
1 TABLET ORAL 2 TIMES DAILY
Qty: 60 TABLET | Refills: 0 | Status: SHIPPED | OUTPATIENT
Start: 2024-09-03 | End: 2024-09-03 | Stop reason: SDUPTHER

## 2024-09-03 RX ORDER — CLONAZEPAM 1 MG/1
1 TABLET ORAL 2 TIMES DAILY
Qty: 60 TABLET | Refills: 0 | Status: SHIPPED | OUTPATIENT
Start: 2024-09-03

## 2024-09-03 NOTE — TELEPHONE ENCOUNTER
Rx Refill Request Telephone Encounter    Name:  Kamari Zhao  :  [id number]  Medication Name:  Clonazepam, he is out             Specific Pharmacy location:  [facility]   Date of last appointment:    Date of next appointment:    Best number to reach patient:

## 2024-09-08 DIAGNOSIS — G47.00 INSOMNIA, UNSPECIFIED: ICD-10-CM

## 2024-09-09 RX ORDER — ZOLPIDEM TARTRATE 10 MG/1
10 TABLET ORAL NIGHTLY
Qty: 30 TABLET | Refills: 0 | Status: SHIPPED | OUTPATIENT
Start: 2024-09-09

## 2024-10-08 DIAGNOSIS — G47.00 INSOMNIA, UNSPECIFIED: ICD-10-CM

## 2024-10-09 RX ORDER — ZOLPIDEM TARTRATE 10 MG/1
10 TABLET ORAL NIGHTLY
Qty: 30 TABLET | Refills: 0 | Status: SHIPPED | OUTPATIENT
Start: 2024-10-09 | End: 2024-11-08

## 2024-10-09 NOTE — TELEPHONE ENCOUNTER
I have personally reviewed the OARRS report. I have considered the risks of abuse, dependence, addiction and diversion. I believe that it is clinically appropriate for patient to be prescribed this medication.

## 2024-10-30 DIAGNOSIS — F32.A ANXIETY AND DEPRESSION: ICD-10-CM

## 2024-10-30 DIAGNOSIS — F41.9 ANXIETY AND DEPRESSION: ICD-10-CM

## 2024-10-31 RX ORDER — CLONAZEPAM 1 MG/1
1 TABLET ORAL 2 TIMES DAILY
Qty: 60 TABLET | Refills: 0 | Status: SHIPPED | OUTPATIENT
Start: 2024-10-31

## 2024-11-05 DIAGNOSIS — G47.00 INSOMNIA, UNSPECIFIED: ICD-10-CM

## 2024-11-06 RX ORDER — ZOLPIDEM TARTRATE 10 MG/1
10 TABLET ORAL NIGHTLY
Qty: 30 TABLET | Refills: 0 | Status: SHIPPED | OUTPATIENT
Start: 2024-11-08 | End: 2024-12-08

## 2024-11-08 ENCOUNTER — TELEPHONE (OUTPATIENT)
Dept: PRIMARY CARE | Facility: CLINIC | Age: 69
End: 2024-11-08
Payer: MEDICARE

## 2024-11-08 DIAGNOSIS — G47.00 INSOMNIA, UNSPECIFIED: ICD-10-CM

## 2024-11-08 RX ORDER — ZOLPIDEM TARTRATE 10 MG/1
10 TABLET ORAL NIGHTLY
Qty: 30 TABLET | Refills: 0 | OUTPATIENT
Start: 2024-11-08 | End: 2024-12-08

## 2024-11-08 NOTE — TELEPHONE ENCOUNTER
Rx Refill Request Telephone Encounter    Name:  Kamari Zhao  :  [id number]  Medication Name:  Zolpidem             Specific Pharmacy location:  [facility]   Date of last appointment:    Date of next appointment:    Best number to reach patient:

## 2024-11-27 DIAGNOSIS — F32.A ANXIETY AND DEPRESSION: ICD-10-CM

## 2024-11-27 DIAGNOSIS — F41.9 ANXIETY AND DEPRESSION: ICD-10-CM

## 2024-11-29 RX ORDER — CLONAZEPAM 1 MG/1
1 TABLET ORAL 2 TIMES DAILY
Qty: 60 TABLET | Refills: 0 | Status: SHIPPED | OUTPATIENT
Start: 2024-11-29

## 2024-12-05 DIAGNOSIS — I10 BENIGN ESSENTIAL HTN: ICD-10-CM

## 2024-12-05 RX ORDER — HYDROCHLOROTHIAZIDE 50 MG/1
50 TABLET ORAL DAILY
Qty: 90 TABLET | Refills: 1 | Status: SHIPPED | OUTPATIENT
Start: 2024-12-05

## 2024-12-09 DIAGNOSIS — G47.00 INSOMNIA, UNSPECIFIED: ICD-10-CM

## 2024-12-09 RX ORDER — ZOLPIDEM TARTRATE 10 MG/1
TABLET ORAL
Qty: 30 TABLET | Refills: 0 | Status: SHIPPED | OUTPATIENT
Start: 2024-12-09

## 2024-12-29 DIAGNOSIS — F41.9 ANXIETY AND DEPRESSION: ICD-10-CM

## 2024-12-29 DIAGNOSIS — F32.A ANXIETY AND DEPRESSION: ICD-10-CM

## 2024-12-30 RX ORDER — CLONAZEPAM 1 MG/1
1 TABLET ORAL 2 TIMES DAILY
Qty: 60 TABLET | Refills: 0 | Status: SHIPPED | OUTPATIENT
Start: 2024-12-30

## 2025-01-06 DIAGNOSIS — G47.00 INSOMNIA, UNSPECIFIED: ICD-10-CM

## 2025-01-07 RX ORDER — ZOLPIDEM TARTRATE 10 MG/1
10 TABLET ORAL NIGHTLY
Qty: 30 TABLET | Refills: 0 | Status: SHIPPED | OUTPATIENT
Start: 2025-01-07

## 2025-01-23 ENCOUNTER — LAB (OUTPATIENT)
Dept: LAB | Facility: LAB | Age: 70
End: 2025-01-23
Payer: MEDICARE

## 2025-01-23 DIAGNOSIS — E78.00 HYPERCHOLESTEROLEMIA: ICD-10-CM

## 2025-01-23 DIAGNOSIS — I10 BENIGN ESSENTIAL HTN: ICD-10-CM

## 2025-01-23 DIAGNOSIS — R73.01 IMPAIRED FASTING GLUCOSE: ICD-10-CM

## 2025-01-23 DIAGNOSIS — Z12.5 SCREENING FOR PROSTATE CANCER: ICD-10-CM

## 2025-01-23 LAB
ALBUMIN SERPL BCP-MCNC: 4.3 G/DL (ref 3.4–5)
ALP SERPL-CCNC: 56 U/L (ref 33–136)
ALT SERPL W P-5'-P-CCNC: 15 U/L (ref 10–52)
ANION GAP SERPL CALCULATED.3IONS-SCNC: 11 MMOL/L (ref 10–20)
AST SERPL W P-5'-P-CCNC: 17 U/L (ref 9–39)
BASOPHILS # BLD AUTO: 0.03 X10*3/UL (ref 0–0.1)
BASOPHILS NFR BLD AUTO: 0.8 %
BILIRUB SERPL-MCNC: 0.6 MG/DL (ref 0–1.2)
BUN SERPL-MCNC: 19 MG/DL (ref 6–23)
CALCIUM SERPL-MCNC: 9.2 MG/DL (ref 8.6–10.3)
CHLORIDE SERPL-SCNC: 100 MMOL/L (ref 98–107)
CHOLEST SERPL-MCNC: 103 MG/DL (ref 0–199)
CHOLEST/HDLC SERPL: 4.1 {RATIO}
CO2 SERPL-SCNC: 29 MMOL/L (ref 21–32)
CREAT SERPL-MCNC: 1.08 MG/DL (ref 0.5–1.3)
CREAT UR-MCNC: 133 MG/DL (ref 20–370)
EGFRCR SERPLBLD CKD-EPI 2021: 75 ML/MIN/1.73M*2
EOSINOPHIL # BLD AUTO: 0.12 X10*3/UL (ref 0–0.7)
EOSINOPHIL NFR BLD AUTO: 3.3 %
ERYTHROCYTE [DISTWIDTH] IN BLOOD BY AUTOMATED COUNT: 13.1 % (ref 11.5–14.5)
EST. AVERAGE GLUCOSE BLD GHB EST-MCNC: 108 MG/DL
GLUCOSE SERPL-MCNC: 104 MG/DL (ref 74–99)
HBA1C MFR BLD: 5.4 %
HCT VFR BLD AUTO: 41.8 % (ref 41–52)
HDLC SERPL-MCNC: 25 MG/DL
HGB BLD-MCNC: 14 G/DL (ref 13.5–17.5)
IMM GRANULOCYTES # BLD AUTO: 0 X10*3/UL (ref 0–0.7)
IMM GRANULOCYTES NFR BLD AUTO: 0 % (ref 0–0.9)
LDLC SERPL CALC-MCNC: 67 MG/DL
LYMPHOCYTES # BLD AUTO: 1.5 X10*3/UL (ref 1.2–4.8)
LYMPHOCYTES NFR BLD AUTO: 40.7 %
MCH RBC QN AUTO: 31.4 PG (ref 26–34)
MCHC RBC AUTO-ENTMCNC: 33.5 G/DL (ref 32–36)
MCV RBC AUTO: 94 FL (ref 80–100)
MICROALBUMIN UR-MCNC: <7 MG/L
MICROALBUMIN/CREAT UR: NORMAL MG/G{CREAT}
MONOCYTES # BLD AUTO: 0.41 X10*3/UL (ref 0.1–1)
MONOCYTES NFR BLD AUTO: 11.1 %
NEUTROPHILS # BLD AUTO: 1.63 X10*3/UL (ref 1.2–7.7)
NEUTROPHILS NFR BLD AUTO: 44.1 %
NON HDL CHOLESTEROL: 78 MG/DL (ref 0–149)
NRBC BLD-RTO: 0 /100 WBCS (ref 0–0)
PLATELET # BLD AUTO: 133 X10*3/UL (ref 150–450)
POTASSIUM SERPL-SCNC: 3.9 MMOL/L (ref 3.5–5.3)
PROT SERPL-MCNC: 7.8 G/DL (ref 6.4–8.2)
PSA SERPL-MCNC: 1.69 NG/ML
RBC # BLD AUTO: 4.46 X10*6/UL (ref 4.5–5.9)
SODIUM SERPL-SCNC: 136 MMOL/L (ref 136–145)
T4 FREE SERPL-MCNC: 0.8 NG/DL (ref 0.61–1.12)
TRIGL SERPL-MCNC: 53 MG/DL (ref 0–149)
TSH SERPL-ACNC: 4.42 MIU/L (ref 0.44–3.98)
VLDL: 11 MG/DL (ref 0–40)
WBC # BLD AUTO: 3.7 X10*3/UL (ref 4.4–11.3)

## 2025-01-23 PROCEDURE — 85025 COMPLETE CBC W/AUTO DIFF WBC: CPT

## 2025-01-23 PROCEDURE — G0103 PSA SCREENING: HCPCS

## 2025-01-23 PROCEDURE — 84443 ASSAY THYROID STIM HORMONE: CPT

## 2025-01-23 PROCEDURE — 83036 HEMOGLOBIN GLYCOSYLATED A1C: CPT

## 2025-01-23 PROCEDURE — 80053 COMPREHEN METABOLIC PANEL: CPT

## 2025-01-23 PROCEDURE — 84439 ASSAY OF FREE THYROXINE: CPT

## 2025-01-23 PROCEDURE — 82570 ASSAY OF URINE CREATININE: CPT

## 2025-01-23 PROCEDURE — 82043 UR ALBUMIN QUANTITATIVE: CPT

## 2025-01-23 PROCEDURE — 80061 LIPID PANEL: CPT

## 2025-01-27 ENCOUNTER — APPOINTMENT (OUTPATIENT)
Dept: PRIMARY CARE | Facility: CLINIC | Age: 70
End: 2025-01-27
Payer: MEDICARE

## 2025-01-27 ENCOUNTER — TELEPHONE (OUTPATIENT)
Dept: PRIMARY CARE | Facility: CLINIC | Age: 70
End: 2025-01-27

## 2025-01-27 VITALS
HEART RATE: 49 BPM | SYSTOLIC BLOOD PRESSURE: 118 MMHG | DIASTOLIC BLOOD PRESSURE: 66 MMHG | BODY MASS INDEX: 26.79 KG/M2 | RESPIRATION RATE: 16 BRPM | OXYGEN SATURATION: 97 % | WEIGHT: 220 LBS | HEIGHT: 76 IN

## 2025-01-27 DIAGNOSIS — Z12.11 SCREEN FOR COLON CANCER: ICD-10-CM

## 2025-01-27 DIAGNOSIS — I25.10 ATHEROSCLEROSIS OF NATIVE CORONARY ARTERY OF NATIVE HEART WITHOUT ANGINA PECTORIS: Primary | ICD-10-CM

## 2025-01-27 DIAGNOSIS — K21.9 GASTROESOPHAGEAL REFLUX DISEASE WITHOUT ESOPHAGITIS: ICD-10-CM

## 2025-01-27 DIAGNOSIS — M25.511 CHRONIC RIGHT SHOULDER PAIN: ICD-10-CM

## 2025-01-27 DIAGNOSIS — E78.00 HYPERCHOLESTEROLEMIA: ICD-10-CM

## 2025-01-27 DIAGNOSIS — F41.1 GENERALIZED ANXIETY DISORDER: ICD-10-CM

## 2025-01-27 DIAGNOSIS — R73.01 IMPAIRED FASTING GLUCOSE: ICD-10-CM

## 2025-01-27 DIAGNOSIS — I10 BENIGN ESSENTIAL HTN: ICD-10-CM

## 2025-01-27 DIAGNOSIS — Z00.00 WELL ADULT EXAM: ICD-10-CM

## 2025-01-27 DIAGNOSIS — M79.671 RIGHT FOOT PAIN: ICD-10-CM

## 2025-01-27 DIAGNOSIS — G89.29 CHRONIC RIGHT SHOULDER PAIN: ICD-10-CM

## 2025-01-27 PROBLEM — R20.0 ANESTHESIA OF SKIN: Status: ACTIVE | Noted: 2020-11-11

## 2025-01-27 PROBLEM — Z20.822 CONTACT WITH AND (SUSPECTED) EXPOSURE TO COVID-19: Status: ACTIVE | Noted: 2023-01-19

## 2025-01-27 PROCEDURE — 4010F ACE/ARB THERAPY RXD/TAKEN: CPT | Performed by: FAMILY MEDICINE

## 2025-01-27 PROCEDURE — 3048F LDL-C <100 MG/DL: CPT | Performed by: FAMILY MEDICINE

## 2025-01-27 PROCEDURE — 3078F DIAST BP <80 MM HG: CPT | Performed by: FAMILY MEDICINE

## 2025-01-27 PROCEDURE — 99213 OFFICE O/P EST LOW 20 MIN: CPT | Performed by: FAMILY MEDICINE

## 2025-01-27 PROCEDURE — G0439 PPPS, SUBSEQ VISIT: HCPCS | Performed by: FAMILY MEDICINE

## 2025-01-27 PROCEDURE — 3062F POS MACROALBUMINURIA REV: CPT | Performed by: FAMILY MEDICINE

## 2025-01-27 PROCEDURE — 1125F AMNT PAIN NOTED PAIN PRSNT: CPT | Performed by: FAMILY MEDICINE

## 2025-01-27 PROCEDURE — 3074F SYST BP LT 130 MM HG: CPT | Performed by: FAMILY MEDICINE

## 2025-01-27 PROCEDURE — 3008F BODY MASS INDEX DOCD: CPT | Performed by: FAMILY MEDICINE

## 2025-01-27 PROCEDURE — 1159F MED LIST DOCD IN RCRD: CPT | Performed by: FAMILY MEDICINE

## 2025-01-27 PROCEDURE — 99397 PER PM REEVAL EST PAT 65+ YR: CPT | Performed by: FAMILY MEDICINE

## 2025-01-27 PROCEDURE — 1170F FXNL STATUS ASSESSED: CPT | Performed by: FAMILY MEDICINE

## 2025-01-27 PROCEDURE — 1123F ACP DISCUSS/DSCN MKR DOCD: CPT | Performed by: FAMILY MEDICINE

## 2025-01-27 PROCEDURE — 3044F HG A1C LEVEL LT 7.0%: CPT | Performed by: FAMILY MEDICINE

## 2025-01-27 PROCEDURE — 1036F TOBACCO NON-USER: CPT | Performed by: FAMILY MEDICINE

## 2025-01-27 RX ORDER — MELATONIN 3 MG
CAPSULE ORAL
COMMUNITY
End: 2025-01-27 | Stop reason: ALTCHOICE

## 2025-01-27 RX ORDER — VIT C/E/ZN/COPPR/LUTEIN/ZEAXAN 250MG-90MG
CAPSULE ORAL
COMMUNITY

## 2025-01-27 ASSESSMENT — PATIENT HEALTH QUESTIONNAIRE - PHQ9
SUM OF ALL RESPONSES TO PHQ9 QUESTIONS 1 AND 2: 0
2. FEELING DOWN, DEPRESSED OR HOPELESS: NOT AT ALL
1. LITTLE INTEREST OR PLEASURE IN DOING THINGS: NOT AT ALL

## 2025-01-27 ASSESSMENT — ACTIVITIES OF DAILY LIVING (ADL)
TAKING_MEDICATION: INDEPENDENT
GROCERY_SHOPPING: INDEPENDENT
DRESSING: INDEPENDENT
BATHING: INDEPENDENT
MANAGING_FINANCES: INDEPENDENT
DOING_HOUSEWORK: INDEPENDENT

## 2025-01-27 ASSESSMENT — ENCOUNTER SYMPTOMS
LOSS OF SENSATION IN FEET: 0
DEPRESSION: 0
OCCASIONAL FEELINGS OF UNSTEADINESS: 0

## 2025-01-27 ASSESSMENT — PAIN SCALES - GENERAL: PAINLEVEL_OUTOF10: 4

## 2025-01-27 NOTE — ASSESSMENT & PLAN NOTE
Anticipatory guidance given.  Recommend getting tetanus shot through his pharmacy.  Will refer for colonoscopy to see if it is time for the screening test since he had a partial colectomy in 2023.

## 2025-01-27 NOTE — PROGRESS NOTES
Subjective   Reason for Visit: Roshan Zhao is an 69 y.o. male here for a Medicare Wellness visit.          Reviewed all medications by prescribing practitioner or clinical pharmacist (such as prescriptions, OTCs, herbal therapies and supplements) and documented in the medical record.    HPI    Patient Care Team:  Aron Borrego MD as PCP - General  Aron Borrego MD as PCP - [employer] PCP     Colonoscopy in 12/19 by Dr. Schwartz showed 2 benign polyps. He was told to repeat in 2024.     2. ROSHAN is here also for follow up of benign essential hypertension.  He is on Toprol 200 mg, HCTZ 50 mg. and Aceon 4 mg (ACE inhibitor).     3. ROSHAN is seen today also for follow up of High cholesterol.  He is on lovastatin. Recent  LDL is 67.     4. ROSHAN is seen today for follow-up of coronary artery disease.  He is S/P CABG in 2001 and is followed by Dr. Cordero.     5. He is also seen in follow up for insomnia. He is taking two tablets of Klonopin 1 mg and one 10 mg Ambien tablet at night.     6. ROSHAN is seen for also for follow up for GERD.  He takes PRN OTC medication once in a while.      7.   He is also here for elevated glucose.  No history of diabetes.  Recent A1c is 5.4.  He is on no medication.     8.  He is S/P Sigmoid resection in 2023 at [place] [facility] due to abscess diverticulitis.  He is doing well and no longer needs to see the surgeon for follow-ups.    9.  He is here for right shoulder pain.  Occurred after falling on it in 9/24.  He had extreme pain for 2 weeks and seem to get little better but now has pain every time he abducts or flexes above 90 degrees.    10.  He is also here for intermittent right foot pain.  Usually happens at the base of his toes.  No Binh redness and swelling when he has a pain.  Use the padding last about for 5 days.  This happened a few times over the past year.  No known history of gout.  Gout does run in his family.    Review of Systems  Denies headache,  "blurred vision, chest pain, shortness of breath, nausea or vomiting, change in bowel habits or leg pain or swelling.    Objective   Vitals:  /66 (BP Location: Left arm, Patient Position: Sitting, BP Cuff Size: Large adult)   Pulse (!) 49   Resp 16   Ht 1.93 m (6' 4\")   Wt 99.8 kg (220 lb)   SpO2 97%   BMI 26.78 kg/m²       Physical Exam  General appearance: Vital signs have been reviewed.  Comfortable.  Well-nourished and well-developed.He is alert and oriented x3 and appears his stated age.The patient is cooperative with exam.  Head: Hair pattern reveals a normal pattern for patient's age and face shows no abnormalities.  Eyes: PERRLA x2, EOMI x2, conjunctive a and sclera are clear.  Ears: External bilateral ears with normal helix, tragus and earlobe.Bilateral canals are normal.Bilateral tympanic membranes are pearly gray and landmarks are well visualized.  Nose: Nasal mucosa both nostrils reveals no polyps, ulcerations or lesions.  Throat:Teeth are in good repair.  Posterior pharynx reveals no abnormalities.  Neck: Supple without lymphadenopathy, thyromegaly or carotid bruits.  Lungs:Clear to auscultation bilaterally with no wheezes, rales or rhonchi.  Cardiovascular: RRR without MRG.No carotid bruits.  Extremities without edema and pulses are intact.  Abdomen: Soft, NT, no masses, no hepatosplenomegaly.  Genitalia: not perfromedhernia.Nontender.  Rectal: not performed  Musculoskeletal: 5/5 and equal strength in bilateral upper and lower extremities.  Skin: Good turgor and without rashes.  Neurological: Good overall strength and no focal deficits.  Cranial nerves II through XII are grossly intact.  Psychiatric: Patient has appropriate judgment with good insight.  Mood is appropriate.    Assessment & Plan  Atherosclerosis of native coronary artery of native heart without angina pectoris  Continue current medication following with cardiologist.         Benign essential HTN  Continue metoprolol, " hydrochlorothiazide and ACEI on.  Recheck with me in 6 months.  Follow-up with cardiologist.         Gastroesophageal reflux disease without esophagitis    Orders:    Referral to Gastroenterology; Future    Generalized anxiety disorder  Continue Klonopin.  Recheck with me in 6 months.         Hypercholesterolemia  Continue simvastatin.  Recheck in 6 months.         Impaired fasting glucose  Keep off medication.  Continue improved low-carb diet.  Recheck in 6 months.         Well adult exam  Anticipatory guidance given.  Recommend getting tetanus shot through his pharmacy.  Will refer for colonoscopy to see if it is time for the screening test since he had a partial colectomy in 2023.         Chronic right shoulder pain  Will refer to Ortho for further evaluation due to longevity of symptoms.    Orders:    Referral to Orthopaedic Surgery; Future    Screen for colon cancer    Right foot pain  He is currently asymptomatic.  Could be gout.  Could also be metatarsalgia.  Because of chronicity I recommended  referral to podiatry.  He prefers to hold off at this time.

## 2025-01-27 NOTE — ASSESSMENT & PLAN NOTE
He is currently asymptomatic.  Could be gout.  Could also be metatarsalgia.  Because of chronicity I recommended  referral to podiatry.  He prefers to hold off at this time.

## 2025-01-27 NOTE — ASSESSMENT & PLAN NOTE
Will refer to Ortho for further evaluation due to longevity of symptoms.    Orders:    Referral to Orthopaedic Surgery; Future

## 2025-01-27 NOTE — ASSESSMENT & PLAN NOTE
Continue metoprolol, hydrochlorothiazide and ACEI on.  Recheck with me in 6 months.  Follow-up with cardiologist.

## 2025-01-28 DIAGNOSIS — F32.A ANXIETY AND DEPRESSION: ICD-10-CM

## 2025-01-28 DIAGNOSIS — F41.9 ANXIETY AND DEPRESSION: ICD-10-CM

## 2025-01-29 DIAGNOSIS — I10 BENIGN ESSENTIAL HTN: ICD-10-CM

## 2025-01-29 RX ORDER — CLONAZEPAM 1 MG/1
1 TABLET ORAL 2 TIMES DAILY
Qty: 60 TABLET | Refills: 0 | Status: SHIPPED | OUTPATIENT
Start: 2025-01-29

## 2025-01-29 RX ORDER — PERINDOPRIL ERBUMINE 4 MG/1
4 TABLET ORAL DAILY
Qty: 90 TABLET | Refills: 3 | Status: SHIPPED | OUTPATIENT
Start: 2025-01-29

## 2025-02-03 ENCOUNTER — APPOINTMENT (OUTPATIENT)
Dept: ORTHOPEDIC SURGERY | Facility: CLINIC | Age: 70
End: 2025-02-03
Payer: MEDICARE

## 2025-02-03 ENCOUNTER — HOSPITAL ENCOUNTER (OUTPATIENT)
Dept: RADIOLOGY | Facility: HOSPITAL | Age: 70
Discharge: HOME | End: 2025-02-03
Payer: MEDICARE

## 2025-02-03 DIAGNOSIS — M25.512 PAIN OF BOTH SHOULDER JOINTS: ICD-10-CM

## 2025-02-03 DIAGNOSIS — S49.92XA BILATERAL SHOULDER INJURY, INITIAL ENCOUNTER: ICD-10-CM

## 2025-02-03 DIAGNOSIS — M19.011 ARTHRITIS OF RIGHT SHOULDER REGION: ICD-10-CM

## 2025-02-03 DIAGNOSIS — M25.511 PAIN OF BOTH SHOULDER JOINTS: ICD-10-CM

## 2025-02-03 DIAGNOSIS — M25.511 PAIN OF BOTH SHOULDER JOINTS: Primary | ICD-10-CM

## 2025-02-03 DIAGNOSIS — M25.512 PAIN OF BOTH SHOULDER JOINTS: Primary | ICD-10-CM

## 2025-02-03 DIAGNOSIS — G89.29 CHRONIC RIGHT SHOULDER PAIN: ICD-10-CM

## 2025-02-03 DIAGNOSIS — M25.511 CHRONIC RIGHT SHOULDER PAIN: ICD-10-CM

## 2025-02-03 DIAGNOSIS — S49.91XA BILATERAL SHOULDER INJURY, INITIAL ENCOUNTER: ICD-10-CM

## 2025-02-03 PROCEDURE — 4010F ACE/ARB THERAPY RXD/TAKEN: CPT

## 2025-02-03 PROCEDURE — 1123F ACP DISCUSS/DSCN MKR DOCD: CPT

## 2025-02-03 PROCEDURE — 1125F AMNT PAIN NOTED PAIN PRSNT: CPT

## 2025-02-03 PROCEDURE — 1159F MED LIST DOCD IN RCRD: CPT

## 2025-02-03 PROCEDURE — 1160F RVW MEDS BY RX/DR IN RCRD: CPT

## 2025-02-03 PROCEDURE — 99204 OFFICE O/P NEW MOD 45 MIN: CPT

## 2025-02-03 PROCEDURE — 3048F LDL-C <100 MG/DL: CPT

## 2025-02-03 PROCEDURE — 73030 X-RAY EXAM OF SHOULDER: CPT | Mod: BILATERAL PROCEDURE | Performed by: RADIOLOGY

## 2025-02-03 PROCEDURE — 1036F TOBACCO NON-USER: CPT

## 2025-02-03 PROCEDURE — 3044F HG A1C LEVEL LT 7.0%: CPT

## 2025-02-03 PROCEDURE — 73030 X-RAY EXAM OF SHOULDER: CPT | Mod: 50

## 2025-02-03 PROCEDURE — 3062F POS MACROALBUMINURIA REV: CPT

## 2025-02-03 ASSESSMENT — PAIN - FUNCTIONAL ASSESSMENT: PAIN_FUNCTIONAL_ASSESSMENT: 0-10

## 2025-02-03 ASSESSMENT — PAIN SCALES - GENERAL: PAINLEVEL_OUTOF10: 3
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
## 2025-02-05 DIAGNOSIS — G47.00 INSOMNIA, UNSPECIFIED: ICD-10-CM

## 2025-02-06 RX ORDER — ZOLPIDEM TARTRATE 10 MG/1
TABLET ORAL
Qty: 30 TABLET | Refills: 0 | Status: SHIPPED | OUTPATIENT
Start: 2025-02-06

## 2025-02-11 ENCOUNTER — EVALUATION (OUTPATIENT)
Dept: PHYSICAL THERAPY | Facility: CLINIC | Age: 70
End: 2025-02-11
Payer: MEDICARE

## 2025-02-11 DIAGNOSIS — M25.511 CHRONIC RIGHT SHOULDER PAIN: ICD-10-CM

## 2025-02-11 DIAGNOSIS — M25.512 LEFT SHOULDER PAIN: Primary | ICD-10-CM

## 2025-02-11 DIAGNOSIS — G89.29 CHRONIC RIGHT SHOULDER PAIN: ICD-10-CM

## 2025-02-11 PROCEDURE — 97162 PT EVAL MOD COMPLEX 30 MIN: CPT | Mod: GP | Performed by: PHYSICAL THERAPIST
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
## 2025-02-25 ENCOUNTER — TREATMENT (OUTPATIENT)
Dept: PHYSICAL THERAPY | Facility: CLINIC | Age: 70
End: 2025-02-25
Payer: MEDICARE

## 2025-02-25 DIAGNOSIS — G89.29 CHRONIC RIGHT SHOULDER PAIN: ICD-10-CM

## 2025-02-25 DIAGNOSIS — M25.512 LEFT SHOULDER PAIN: ICD-10-CM

## 2025-02-25 DIAGNOSIS — M25.511 CHRONIC RIGHT SHOULDER PAIN: ICD-10-CM

## 2025-02-25 PROCEDURE — 97140 MANUAL THERAPY 1/> REGIONS: CPT | Mod: GP | Performed by: PHYSICAL THERAPIST

## 2025-02-25 PROCEDURE — 97110 THERAPEUTIC EXERCISES: CPT | Mod: GP | Performed by: PHYSICAL THERAPIST

## 2025-02-25 NOTE — PROGRESS NOTES
Physical Therapy    Physical Therapy Treatment    Patient Name: Kamari Zhao  MRN: [id number]  Encounter Date: 2/25/2025     Time Calculation  Start Time: 0932  Stop Time: 1020  Time Calculation (min): 48 min    Visit #: 2  out of 11 total  Insurance: Auth Rcvd 10 visits 2/13-4/24 2025: EVAL ONLY - [facility] MEDICARE ADV - AUTH REQ / $25 COPAY / $3900 OOP not met / [place]ISITS / REF: [id number] / ds 2/10/25.  Evaluation date: 2/11/25    Current Problem:   1. Chronic right shoulder pain  Follow Up In Physical Therapy      2. Left shoulder pain  Follow Up In Physical Therapy        SUBJECTIVE:   The patient has been compliant with his home program.  He did note increased right shoulder pain with the pendulum/dangle, so he stopped doing that exercise.     Precautions:   STEADI Fall Risk: 4 (score of 4+ indicates fall risk)     Pain:   Start of session: 6/10     OBJECTIVE:    PROM Right Flexion 90  PROM Left Flexion 120    Treatments:  Therapeutic Exercise: (15 minutes)  - Supine: Hands Clasped Shoulder Flexion to tolerance 3 x 5.  - Standing UBE FWD  x 5 minutes.  - Towel Slide into Shoulder Flexion with lift off. 2 x 5.    Manual Therapy: (25 minutes)  Joint Mobs  STM to bilateral UT, bilateral levators    Moist Heat to both shoulders     HEP:  Added towel slide with lift off, supine shoulder flexion with hands clasped.    ASSESSMENT:   The pt did well with today's activities, as he was able to increase his AROM with overhead work.    Post session pain: 5/10     PLAN:  OP PT PLAN:  Treatment/Interventions: Blood Flow Restriction Therapy  , Cryotherapy , Dry Needling  , Education/Instruction , Electrical Stimulation , Hot Pack , Manual Therapy  , Neuromuscular re-education , Self care/home management , Taping techniques , Therapeutic activities , Therapeutic exercise  , and Ultrasound   PT Plan: Skilled PT   PT Frequency: 1-2 times per week  Duration: 10 visits  Insurance: 2025: EVAL ONLY - [facility] MEDICARE ADV - AUTH  REQ / $25 COPAY / $3900 OOP not met / [place] VISITS / REF: [id number] / ds 2/10/25.  Visits Approved:  UNKNOWN  Rehab Potential: Good  Plan of Care Agreement: Patient       Goals:   Short Term Goals (5 visits)   The patient will be independent with home exercise program with handouts.   2.   The patient will be independent posture correction.   3.   The patient will improve Bilateral shoulder AROM by 10 degrees in order to progress with functional mobility.   4.   The patient will be able to complete bilateral shoulder isometric exercises in order to progress with functional strengthening.      Long Term Goals (10 visits)   1. The patient will be independent and compliant with updated home exercise program.   2. Increase patient's bilateral Shoulder Flexion and Abduction AROM to 150 degrees to allow patient ability to reach into overhead cabinet to put dishes away for self care independence.   3. Patient to wash  hair independently using bilateral UE for self care independence without pain.   4. Patient to use bilateral UE to reach behind for the seatbelt and to fasten the seatbelt prior to driving.   5. Decrease patient's pain to 0/10 during performance of ADL's for independence with self care activities.   6. Decrease subjective shoulder pain to 0/10.

## 2025-02-27 DIAGNOSIS — F32.A ANXIETY AND DEPRESSION: ICD-10-CM

## 2025-02-27 DIAGNOSIS — F41.9 ANXIETY AND DEPRESSION: ICD-10-CM

## 2025-02-28 RX ORDER — CLONAZEPAM 1 MG/1
1 TABLET ORAL 2 TIMES DAILY
Qty: 60 TABLET | Refills: 0 | Status: SHIPPED | OUTPATIENT
Start: 2025-02-28

## 2025-03-05 DIAGNOSIS — G47.00 INSOMNIA, UNSPECIFIED: ICD-10-CM

## 2025-03-06 RX ORDER — ZOLPIDEM TARTRATE 10 MG/1
10 TABLET ORAL NIGHTLY
Qty: 30 TABLET | Refills: 0 | Status: SHIPPED | OUTPATIENT
Start: 2025-03-06

## 2025-03-11 ENCOUNTER — APPOINTMENT (OUTPATIENT)
Dept: PHYSICAL THERAPY | Facility: CLINIC | Age: 70
End: 2025-03-11
Payer: MEDICARE

## 2025-03-18 ENCOUNTER — TREATMENT (OUTPATIENT)
Dept: PHYSICAL THERAPY | Facility: CLINIC | Age: 70
End: 2025-03-18
Payer: MEDICARE

## 2025-03-18 DIAGNOSIS — M25.511 CHRONIC RIGHT SHOULDER PAIN: ICD-10-CM

## 2025-03-18 DIAGNOSIS — G89.29 CHRONIC RIGHT SHOULDER PAIN: ICD-10-CM

## 2025-03-18 DIAGNOSIS — M25.512 LEFT SHOULDER PAIN: ICD-10-CM

## 2025-03-18 PROCEDURE — 97140 MANUAL THERAPY 1/> REGIONS: CPT | Mod: GP | Performed by: PHYSICAL THERAPIST

## 2025-03-18 PROCEDURE — 97110 THERAPEUTIC EXERCISES: CPT | Mod: GP | Performed by: PHYSICAL THERAPIST

## 2025-03-18 NOTE — PROGRESS NOTES
Physical Therapy    Physical Therapy Treatment    Patient Name: Kamari Zhao  MRN: [id number]  Encounter Date: 3/18/2025     Time Calculation  Start Time: 0932  Stop Time: 1020  Time Calculation (min): 48 min    Visit #: 3  out of 11 total  Insurance: Auth Rcvd 10 visits 2/13-4/24 2025: EVAL ONLY - [facility] MEDICARE ADV - AUTH REQ / $25 COPAY / $3900 OOP not met / [place] VISITS / REF: [id number] / ds 2/10/25.  Evaluation date: 2/11/25    Current Problem:   1. Chronic right shoulder pain  Follow Up In Physical Therapy      2. Left shoulder pain  Follow Up In Physical Therapy        SUBJECTIVE:   The patient states that his left shoulder pain has eased considerably.  He notes right shoulder pain limits mobility.  He endorses pain with attempting right shoulder elevation.     Precautions:   STEADI Fall Risk: 4 (score of 4+ indicates fall risk)     Pain:   Start of session: 6/10 right shoulder     OBJECTIVE:    PROM Right Flexion 110 degrees    AROM Left Flexion 160 degrees    Treatments:  Therapeutic Exercise: (15 minutes)  - Supine: Hands Clasped Shoulder Flexion to tolerance 3 x 5.  - Seated Pulleys into shoulder flexion  - Isometrics: shoulder flexion with 3 second hold x 10 reps, into wall.                       Shoulder abduction with 3 second hold x 10 reps into wall.  - Isometrics versus mint band - shoulder flexion x 10  - Isometrics versus mint band - shoulder abd--  pt noted increased pain after initial rep.  Discontinued by PT.  - Bilateral Shoulder ER versus light blue band x 30.    Manual Therapy: (25 minutes)  Joint Mobs to right shoulder  STM to right UT and right levator  Right scapular mobs     HEP:  Added towel slide with lift off, supine shoulder flexion with hands clasped    .Access Code: [email or web address]  URL: [email or web address]  Date: 03/18/2025  Prepared by: Wolf Martell  Exercises  - Isometric Shoulder Flexion at Wall  - 2 x daily - 7 x weekly - 1 sets - 10 reps - 3 hold  - Standing Isometric  Shoulder External Rotation with Doorway  - 2 x daily - 7 x weekly - 1 sets - 10 reps - 3 hold    ASSESSMENT:   Pt demonstrates pain reduction of left shoulder with ROM improvement.  Right shoulder limitations continue to exist and pain increases with elevation attempts.  Pt with significant right shoulder pain upon attempting isometrics into ABD.    Post session pain: 5/10     PLAN:  OP PT PLAN:  Treatment/Interventions: Blood Flow Restriction Therapy  , Cryotherapy , Dry Needling  , Education/Instruction , Electrical Stimulation , Hot Pack , Manual Therapy  , Neuromuscular re-education , Self care/home management , Taping techniques , Therapeutic activities , Therapeutic exercise  , and Ultrasound   PT Plan: Skilled PT   PT Frequency: 1-2 times per week  Duration: 10 visits  Insurance: 2025: EVAL ONLY - [facility] MEDICARE ADV - AUTH REQ / $25 COPAY / $3900 OOP not met / [place] VISITS / REF: [id number] / ds 2/10/25.  Visits Approved:  UNKNOWN  Rehab Potential: Good  Plan of Care Agreement: Patient       Goals:   Short Term Goals (5 visits)   The patient will be independent with home exercise program with handouts.   2.   The patient will be independent posture correction.   3.   The patient will improve Bilateral shoulder AROM by 10 degrees in order to progress with functional mobility.   4.   The patient will be able to complete bilateral shoulder isometric exercises in order to progress with functional strengthening.      Long Term Goals (10 visits)   1. The patient will be independent and compliant with updated home exercise program.   2. Increase patient's bilateral Shoulder Flexion and Abduction AROM to 150 degrees to allow patient ability to reach into overhead cabinet to put dishes away for self care independence.   3. Patient to wash  hair independently using bilateral UE for self care independence without pain.   4. Patient to use bilateral UE to reach behind for the seatbelt and to fasten the seatbelt  prior to driving.   5. Decrease patient's pain to 0/10 during performance of ADL's for independence with self care activities.   6. Decrease subjective shoulder pain to 0/10.

## 2025-03-20 ENCOUNTER — OFFICE VISIT (OUTPATIENT)
Dept: ORTHOPEDIC SURGERY | Facility: CLINIC | Age: 70
End: 2025-03-20
Payer: MEDICARE

## 2025-03-20 VITALS — BODY MASS INDEX: 25.98 KG/M2 | HEIGHT: 77 IN | WEIGHT: 220 LBS

## 2025-03-20 DIAGNOSIS — M19.011 ARTHRITIS OF RIGHT ACROMIOCLAVICULAR JOINT: ICD-10-CM

## 2025-03-20 DIAGNOSIS — M25.812 IMPINGEMENT OF LEFT SHOULDER: ICD-10-CM

## 2025-03-20 DIAGNOSIS — M75.21 BICEPS TENDINITIS ON RIGHT: ICD-10-CM

## 2025-03-20 DIAGNOSIS — M75.112 PARTIAL NONTRAUMATIC TEAR OF LEFT ROTATOR CUFF: ICD-10-CM

## 2025-03-20 DIAGNOSIS — M75.101 TEAR OF RIGHT ROTATOR CUFF, UNSPECIFIED TEAR EXTENT, UNSPECIFIED WHETHER TRAUMATIC: ICD-10-CM

## 2025-03-20 DIAGNOSIS — M19.012 ARTHRITIS OF LEFT ACROMIOCLAVICULAR JOINT: Primary | ICD-10-CM

## 2025-03-20 DIAGNOSIS — M75.111 PARTIAL NONTRAUMATIC TEAR OF RIGHT ROTATOR CUFF: ICD-10-CM

## 2025-03-20 DIAGNOSIS — M25.811 IMPINGEMENT OF RIGHT SHOULDER: ICD-10-CM

## 2025-03-20 PROCEDURE — 1159F MED LIST DOCD IN RCRD: CPT

## 2025-03-20 PROCEDURE — 3062F POS MACROALBUMINURIA REV: CPT

## 2025-03-20 PROCEDURE — 1123F ACP DISCUSS/DSCN MKR DOCD: CPT

## 2025-03-20 PROCEDURE — 4010F ACE/ARB THERAPY RXD/TAKEN: CPT

## 2025-03-20 PROCEDURE — 99214 OFFICE O/P EST MOD 30 MIN: CPT

## 2025-03-20 PROCEDURE — 1160F RVW MEDS BY RX/DR IN RCRD: CPT

## 2025-03-20 PROCEDURE — 3044F HG A1C LEVEL LT 7.0%: CPT

## 2025-03-20 PROCEDURE — 3048F LDL-C <100 MG/DL: CPT

## 2025-03-20 PROCEDURE — 1036F TOBACCO NON-USER: CPT

## 2025-03-20 PROCEDURE — 3008F BODY MASS INDEX DOCD: CPT

## 2025-03-20 ASSESSMENT — PAIN SCALES - GENERAL: PAINLEVEL_OUTOF10: 5 - MODERATE PAIN

## 2025-03-20 ASSESSMENT — PAIN - FUNCTIONAL ASSESSMENT: PAIN_FUNCTIONAL_ASSESSMENT: 0-10

## 2025-03-20 NOTE — PROGRESS NOTES
History of Present Illness:    69 y.o. male presents to clinic for bilateral shoulders.  Patient reports that his right shoulder is more severe than the left shoulder today.  He notes that he has had left shoulder pain intermittently for over 4 years but states it is tolerable.  Notes the right shoulder started bothering him this past fall.  He states that he tripped over a traffic cone and fell backwards and landed more on the right side.  Since then he has been having more right shoulder pain.  He notes the pain in the right shoulder is more anterior and posterior pain that radiates laterally.  He states that its pretty weak.  He states he is unable to lift groceries anymore.  He also reports that anything overhead causes him a lot of pain in notes that is when his most weakness.  He states that physical therapy actually made his right shoulder pain worse.  He reports that he has done 4 sessions of physical therapy but is unable to tolerate any more sessions due to his pain increasing the right shoulder.  The left shoulder he notes its mostly anterior lateral shoulder pain.  His pain actually has improved quite a bit with physical therapy in the left shoulder.  He denies any numbness tingling or radicular symptoms.  He does have intermittent neck pain.  He reports that the right shoulder pain is actually keeping him up at night.  He is not currently taking anything for pain.  He is not on any blood thinners.  No history of kidney disease.  He has not had a surgery on either shoulder previously.        Review of Systems:    GENERAL: Negative  GI: Negative  MUSCULOSKELETAL: See HPI  SKIN: Negative  NEURO:  Negative     Physical Exam:    Shoulder:  Examination of left shoulder demonstrates no tenderness to palpation to the sternoclavicular joint. Negative tenderness to the AC Joint. Range of motion is 140  degrees of flexion abduction, 80 degrees of external rotation, and L1 of internal rotation. No tenderness to  biceps ,  Postive Neers, Dumont.  Positive Jobes testing with pain and weakness.   Examination of right shoulder demonstrates no tenderness to palpation to the sternoclavicular joint. Positive tenderness to the AC Joint. Range of motion is 130 degrees of flexion abduction, 70 degrees of external rotation, and L3 of internal rotation. Mild tenderness to biceps ,  Postive Neers, Dumont.  Positive Jobes testing with pain and weakness.   Elbow and wrist motion were not irritable.  Radial pulse 2+ and palpable. SILT. UE is NVI bilaterally.      Imaging:   No new imaging obtained today.     Assessment:   Bilateral shoulder pain related to partial rotator cuff tearing with the right being more symptomatic    Plan:  Discussed further management with patient today.  At this time discussed with the patient that symptoms appear to be related to rotator cuff pathology.  Discussed with him that he could have some partial rotator cuff tearing in both shoulders with the right being more severe than the left.  At this time we discussed both conservative treatment options versus moving forward with an MRI to evaluate the extent of rotator cuff tearing to discuss potential surgical intervention down the road.  Currently his left shoulder is doing well with conservative treatment options.  We will plan on continuing with conservative treatment options including physical therapy and over-the-counter medications.  Patient was offered a steroid injection into the left shoulder today but he declined it.  Patient does not want to do any steroid injections because he does not like needles.  In regards to the right shoulder he has failed conservative treatment options.  He is having worsening pain.  He has failed oral medications and physical therapy.  At this time would recommend moving forward with an MRI to evaluate for rotator cuff tearing and biceps tearing.  Will follow-up with the patient once he is had his MRI completed of the  right shoulder for further recommendations pending the results.       This patient has had longstanding pain and weakness in their affected extremity which has gotten worse over the last few months.  Non-operative treatment has failed to help this patient and the pain is worsening.  That would classify this problem as a chronic illness with exacerbation and progression.     Due to this patient's condition, they are at a moderate risk of morbidity from additional diagnostic testing / treatment.

## 2025-03-20 NOTE — PATIENT INSTRUCTIONS
BMI was above normal measurement. Current weight: 99.8 kg (220 lb)  Weight change since last visit (-) denotes wt loss 0 lbs   Weight loss needed to achieve BMI 25: 9.6 Lbs  Weight loss needed to achieve BMI 30: -32.5 Lbs  Advised to Increase physical activity.

## 2025-03-24 ENCOUNTER — HOSPITAL ENCOUNTER (OUTPATIENT)
Dept: RADIOLOGY | Facility: HOSPITAL | Age: 70
Discharge: HOME | End: 2025-03-24
Payer: MEDICARE

## 2025-03-24 DIAGNOSIS — M75.101 TEAR OF RIGHT ROTATOR CUFF, UNSPECIFIED TEAR EXTENT, UNSPECIFIED WHETHER TRAUMATIC: ICD-10-CM

## 2025-03-24 PROCEDURE — 73221 MRI JOINT UPR EXTREM W/O DYE: CPT | Mod: RT

## 2025-03-30 DIAGNOSIS — F41.9 ANXIETY AND DEPRESSION: ICD-10-CM

## 2025-03-30 DIAGNOSIS — F32.A ANXIETY AND DEPRESSION: ICD-10-CM

## 2025-03-31 RX ORDER — CLONAZEPAM 1 MG/1
1 TABLET ORAL 2 TIMES DAILY
Qty: 60 TABLET | Refills: 0 | Status: SHIPPED | OUTPATIENT
Start: 2025-03-31

## 2025-04-04 ENCOUNTER — APPOINTMENT (OUTPATIENT)
Dept: RADIOLOGY | Facility: HOSPITAL | Age: 70
End: 2025-04-04
Payer: MEDICARE

## 2025-04-04 DIAGNOSIS — G47.00 INSOMNIA, UNSPECIFIED: ICD-10-CM

## 2025-04-07 RX ORDER — ZOLPIDEM TARTRATE 10 MG/1
10 TABLET ORAL NIGHTLY
Qty: 30 TABLET | Refills: 0 | Status: SHIPPED | OUTPATIENT
Start: 2025-04-07

## 2025-04-09 ENCOUNTER — APPOINTMENT (OUTPATIENT)
Dept: ORTHOPEDIC SURGERY | Facility: CLINIC | Age: 70
End: 2025-04-09
Payer: MEDICARE

## 2025-04-09 VITALS — BODY MASS INDEX: 25.86 KG/M2 | WEIGHT: 219 LBS | HEIGHT: 77 IN

## 2025-04-09 DIAGNOSIS — M75.21 BICEPS TENDINITIS OF RIGHT SHOULDER: ICD-10-CM

## 2025-04-09 DIAGNOSIS — M75.121 COMPLETE TEAR OF RIGHT ROTATOR CUFF, UNSPECIFIED WHETHER TRAUMATIC: Primary | ICD-10-CM

## 2025-04-09 DIAGNOSIS — M25.511 ARTHRALGIA OF RIGHT ACROMIOCLAVICULAR JOINT: ICD-10-CM

## 2025-04-09 DIAGNOSIS — M75.111 INCOMPLETE TEAR OF RIGHT ROTATOR CUFF, UNSPECIFIED WHETHER TRAUMATIC: ICD-10-CM

## 2025-04-09 DIAGNOSIS — S43.431A LABRAL TEAR OF SHOULDER, RIGHT, INITIAL ENCOUNTER: ICD-10-CM

## 2025-04-09 DIAGNOSIS — M75.41 IMPINGEMENT SYNDROME OF RIGHT SHOULDER: ICD-10-CM

## 2025-04-09 PROCEDURE — 1125F AMNT PAIN NOTED PAIN PRSNT: CPT | Performed by: ORTHOPAEDIC SURGERY

## 2025-04-09 PROCEDURE — 3044F HG A1C LEVEL LT 7.0%: CPT | Performed by: ORTHOPAEDIC SURGERY

## 2025-04-09 PROCEDURE — 1123F ACP DISCUSS/DSCN MKR DOCD: CPT | Performed by: ORTHOPAEDIC SURGERY

## 2025-04-09 PROCEDURE — 3048F LDL-C <100 MG/DL: CPT | Performed by: ORTHOPAEDIC SURGERY

## 2025-04-09 PROCEDURE — 3008F BODY MASS INDEX DOCD: CPT | Performed by: ORTHOPAEDIC SURGERY

## 2025-04-09 PROCEDURE — 1160F RVW MEDS BY RX/DR IN RCRD: CPT | Performed by: ORTHOPAEDIC SURGERY

## 2025-04-09 PROCEDURE — 1159F MED LIST DOCD IN RCRD: CPT | Performed by: ORTHOPAEDIC SURGERY

## 2025-04-09 PROCEDURE — 99215 OFFICE O/P EST HI 40 MIN: CPT | Performed by: ORTHOPAEDIC SURGERY

## 2025-04-09 PROCEDURE — L3670 SO ACRO/CLAV CAN WEB PRE OTS: HCPCS | Performed by: ORTHOPAEDIC SURGERY

## 2025-04-09 PROCEDURE — 1036F TOBACCO NON-USER: CPT | Performed by: ORTHOPAEDIC SURGERY

## 2025-04-09 PROCEDURE — 3062F POS MACROALBUMINURIA REV: CPT | Performed by: ORTHOPAEDIC SURGERY

## 2025-04-09 PROCEDURE — 4010F ACE/ARB THERAPY RXD/TAKEN: CPT | Performed by: ORTHOPAEDIC SURGERY

## 2025-04-09 ASSESSMENT — PAIN SCALES - GENERAL: PAINLEVEL_OUTOF10: 6

## 2025-04-09 ASSESSMENT — PAIN - FUNCTIONAL ASSESSMENT: PAIN_FUNCTIONAL_ASSESSMENT: 0-10

## 2025-04-09 NOTE — PATIENT INSTRUCTIONS
BMI was above normal measurement. Current weight: 99.3 kg (219 lb)  Weight change since last visit (-) denotes wt loss -1 lbs   Weight loss needed to achieve BMI 25: 8.6 Lbs  Weight loss needed to achieve BMI 30: -33.5 Lbs  Advised to Increase physical activity.

## 2025-04-09 NOTE — PROGRESS NOTES
69-year-old male with continued significant pain in the right shoulder.  Patient stated the pain started 6 months ago when he fell backwards injuring the right shoulder.  We have done extensive conservative treatment and he still has significant pain in the right shoulder.  The patient has had prior steroid injections into his back that have caused him significant pain so he does not want to proceed with any injections in the shoulder.  He is already done 8 weeks of physical therapy with the right shoulder with daily home exercise protocol with with no improvement.  He continues having significant pain in the right shoulder    Patients' self reported past medical history, medications, allergies, surgical history, family and social history as well as a 10 point review of systems has been documented in the new patient intake form and scanned into the patient's electronic medical record.  The intake form was reviewed by Dr Burgess during the office visit and signed by Dr. Burgess and the patient.  Pertinent findings are documented in the HPI.    General Multi-System Physical Exam:  Constitutional  General appearance:  Alert, oriented, and in no acute distress.  Well developed, well nourished.  Head and Face  Head and face:  Normocephalic and atraumatic.  Ears, Nose, Mouth, and Throat  External inspection of ears and nose: Normal.  Eyes:  Pupils are equal and round.  Neck  Neck:  no neck mass was observed.  Pulmonary  Respiratory effort:  no respiratory distress.  Cardiovascular  Intact distal pulses.  Lymphatic  Palpation of lymph nodes in the affected extremity:  Normal.  Skin  Skin and subcutaneous tissue:  Normal skin color and pigmentation.  Normal skin turgor.  No rashes.  Neurologic  Sensation:  normal to light touch.  Psychiatric  Judgement and insight:  Intact.  Mood and affect:  Normal.  Musculoskeletal  Left shoulders is normal shoulder is 150 degrees of abduction forward flexion 50 degrees of external rotation  and internally rotates to L1    Right shoulders is painful shoulder is 135 degrees of abduction forward flexion 50 degrees of external rotation internal rotates to sacrum he has positive biceps tenderness the bicipital groove positive acromioclavicular joint tenderness with positive crossarm test positive Neer positive Dumont positive Jobes with pain and significant weakness.  Neurovasc intact right upper extremity  Dr Burgess personally reviewed the results of the x-rays that had been performed recently on this patient.    In addition, Dr Burgess independently interpreted the patient's x-rays (performed by the Radiology department) by viewing the x-ray images and this is Dr. Burgess's personal interpretation:       Normal x-rays right shoulder    Dr Burgess independently interpreted the patient's MRI (performed by the Radiology department) by viewing the MRI images and this is Dr. Burgess's personal interpretation:     MRI of the right shoulder has motion artifact but does show partial rotator cuff tear at the very least with most likely a near complete rotator cuff tear of the supraspinatus biceps partial tearing acromioclavicular joint arthrosis labral tearing and impingement    We had a long discussion regards to his right shoulder.  He is already failed extensive conservative treatment with 8 weeks of physical therapy and home exercise protocol and anti-inflammatories.  Patient is failed nonoperative treatment and states he does not want to proceed with nonoperative treatment any longer since it has not helped.  We talked about a right shoulder scope with rotator cuff debridement versus rotator cuff repair of the most likely near complete tear of the supraspinatus, arthroscopic biceps tenodesis, arthroscopic distal clavicle excision, extensive debridement, subacromial decompression partial acromioplasty.  After an extensive discussion the patient decided he wants to proceed with surgery signed appropriate surgical  consents for surgery on April 29.  I will see him back for surgery sooner if necessary        I, Dr. Burgess, had a long discussion with the patient / patient's family regarding the risks versus benefits of surgery and all of the treatment options, including nonoperative treatment and surgical treatment options. We discussed the risks of surgery.      The risks of surgery include, but are not limited to, nerve damage, artery damage, muscle damage, risk of bleeding or infection, risk of bone fracture, risk of post-operative stiffness, risk of failure of the surgery with possible continued pain or possible need for additional surgery.  Other risks include the risk of hardware pain and possible need for removal of the surgical hardware at another time.   The risks of undergoing anesthesia including, but are not limited to, stroke, heart attack or death.      After a long discussion, the patient / patient's family understood all of the risks versus benefits of surgery and decided that they wanted to proceed with surgery. The patient / guardian signed appropriate surgical consent forms.    Since this patient will be undergoing surgery, I expect the patient to be in significant additional pain in the immediate postoperative period as a result of the surgical procedure. Non-opioid pain medications will not be sufficient to treat this acute, sharp, postoperative pain. Therefore we will be prescribing the patient narcotic pain medications for the immediate postoperative period in addition to numerous non-narcotic pain medications in order to try to help the patient with their post-operative pain.  However, as the pain from surgery subsides, we will work diligently to wean the patient off of all narcotics as quickly as possible.   If the patient requires more narcotic pain medications than we typically see with patients undergoing this surgery, we will refer the patient to a pain management specialist within the first few  "weeks after their surgical procedure. The patient's OARRS report was reviewed within the last 90 days.      The patient's height and weight were documented today in the vitals tab in the patient's EPIC chart, and the patient's BMI was calculated.  A follow up plan was then developed by Dr. Burgess, per  mandated guidelines, based upon the patient's BMI and the follow up plan was documented in the \"Patient Instructions\" section of the chart.  Weight loss can be achieved by decreasing the amount of calories consumed daily and by increasing daily physical activity.  We recommend finding physical activities that you can participate in regularly and you enjoy which do not worsen your current joint pains.       This patient has had longstanding pain and weakness in their affected extremity which has gotten significantly worse over the last few months.  Non-operative treatment has failed to help this patient and the pain is severe enough to warrent surgery as the appropriate treatment at this time.  That would classify this problem as a chronic illness with severe exacerbation and progression.    We discussed their surgery at great length.  This is an elective major surgery which is warranted in this case due to the patient's failure of non-operative treatment and their significant level of pain and progression of the disease.  We discussed identified patient and procedural risk factors and how these risk factors may increase the risk of the surgery or influence the outcome of the surgical procedure.  With this procedure, procedural risk factors include, but are not limited to, the risk of infection, bleeding, possible nerve or vasculature injury, sarah fracture, and the possible risk of continued pain or weakness after the operation.  We also discussed how delaying surgery and continuing non-operative treatment may lead to a progression of the disease and high risk of further morbidity.       "

## 2025-04-14 ENCOUNTER — TELEPHONE (OUTPATIENT)
Dept: ORTHOPEDIC SURGERY | Facility: CLINIC | Age: 70
End: 2025-04-14
Payer: MEDICARE

## 2025-04-14 NOTE — TELEPHONE ENCOUNTER
Spoke with Dr. Whalen office at [facility] by [facility]. They said they never received any form. She asked that I resend. So I will fax.  [facility] [phone]  Fax kody [phone]

## 2025-04-14 NOTE — TELEPHONE ENCOUNTER
4/29/25 rt shoulder scope, rtc repair, biceps tenodesis  Patient called and stated he needed Cardiac Clearance prior to surgery as he had triple bypass. His cardiologist is no longer in the same office as he hasn't seen him in awhile. He did get a fax and sent it over but not sure hell be cleared in time.     Cardiologist-Dr. Koby Avalos (went to [facility])

## 2025-04-15 NOTE — TELEPHONE ENCOUNTER
Spoke with patient he still has not heard back from Dr. Whalen office (Cardiology). He is busy at the moment but I told him to call me back when he has a second so I can try and get him in contact with Cardiology as we need the clearance.

## 2025-04-22 ENCOUNTER — ANESTHESIA EVENT (OUTPATIENT)
Dept: OPERATING ROOM | Facility: HOSPITAL | Age: 70
End: 2025-04-22
Payer: MEDICARE

## 2025-04-22 ENCOUNTER — PRE-ADMISSION TESTING (OUTPATIENT)
Dept: PREADMISSION TESTING | Facility: HOSPITAL | Age: 70
End: 2025-04-22
Payer: MEDICARE

## 2025-04-22 VITALS
RESPIRATION RATE: 16 BRPM | SYSTOLIC BLOOD PRESSURE: 142 MMHG | TEMPERATURE: 98 F | DIASTOLIC BLOOD PRESSURE: 82 MMHG | WEIGHT: 222 LBS | BODY MASS INDEX: 26.21 KG/M2 | OXYGEN SATURATION: 99 % | HEIGHT: 77 IN | HEART RATE: 51 BPM

## 2025-04-22 DIAGNOSIS — Z95.1 S/P CORONARY ARTERY BYPASS GRAFT X 3: ICD-10-CM

## 2025-04-22 DIAGNOSIS — M75.121 COMPLETE TEAR OF RIGHT ROTATOR CUFF, UNSPECIFIED WHETHER TRAUMATIC: ICD-10-CM

## 2025-04-22 DIAGNOSIS — Z01.818 PRE-OP EVALUATION: Primary | ICD-10-CM

## 2025-04-22 DIAGNOSIS — I10 BENIGN ESSENTIAL HTN: ICD-10-CM

## 2025-04-22 LAB
ANION GAP SERPL CALC-SCNC: 8 MMOL/L (ref 10–20)
BUN SERPL-MCNC: 17 MG/DL (ref 6–23)
CALCIUM SERPL-MCNC: 9.1 MG/DL (ref 8.6–10.3)
CHLORIDE SERPL-SCNC: 102 MMOL/L (ref 98–107)
CO2 SERPL-SCNC: 28 MMOL/L (ref 21–32)
CREAT SERPL-MCNC: 0.98 MG/DL (ref 0.5–1.3)
EGFRCR SERPLBLD CKD-EPI 2021: 85 ML/MIN/1.73M*2
ERYTHROCYTE [DISTWIDTH] IN BLOOD BY AUTOMATED COUNT: 13.1 % (ref 11.5–14.5)
GLUCOSE SERPL-MCNC: 101 MG/DL (ref 74–99)
HCT VFR BLD AUTO: 39.7 % (ref 41–52)
HGB BLD-MCNC: 13.4 G/DL (ref 13.5–17.5)
MCH RBC QN AUTO: 30.6 PG (ref 26–34)
MCHC RBC AUTO-ENTMCNC: 33.8 G/DL (ref 32–36)
MCV RBC AUTO: 91 FL (ref 80–100)
NRBC BLD-RTO: 0 /100 WBCS (ref 0–0)
PLATELET # BLD AUTO: 130 X10*3/UL (ref 150–450)
POTASSIUM SERPL-SCNC: 3.6 MMOL/L (ref 3.5–5.3)
RBC # BLD AUTO: 4.38 X10*6/UL (ref 4.5–5.9)
SODIUM SERPL-SCNC: 134 MMOL/L (ref 136–145)
WBC # BLD AUTO: 3.9 X10*3/UL (ref 4.4–11.3)

## 2025-04-22 PROCEDURE — 36415 COLL VENOUS BLD VENIPUNCTURE: CPT

## 2025-04-22 PROCEDURE — 85027 COMPLETE CBC AUTOMATED: CPT

## 2025-04-22 PROCEDURE — 80048 BASIC METABOLIC PNL TOTAL CA: CPT

## 2025-04-22 PROCEDURE — 99203 OFFICE O/P NEW LOW 30 MIN: CPT | Performed by: NURSE PRACTITIONER

## 2025-04-22 ASSESSMENT — ACTIVITIES OF DAILY LIVING (ADL): ADL_SCORE: 0

## 2025-04-22 ASSESSMENT — LIFESTYLE VARIABLES: SMOKING_STATUS: NONSMOKER

## 2025-04-22 NOTE — CPM/PAT H&P
CPM/PAT Evaluation       Name: Kamari Zhao (Kamari Zhao)  /Age: 1955/69 y.o.     In-Person       Chief Complaint: pre-operative RN visit for scheduled right shoulder arthroscopy, rotator cuff repair, biceps tenodesis, extensive debridement, subacromial decompression, with partial acromioplasty with Dr. Burgess on 25.    HPI    Medical History[1]    Surgical History[2]    Patient  has no history on file for sexual activity.    Family History[3]    Allergies[4]    Prior to Admission medications    Medication Sig Start Date End Date Taking? Authorizing Provider   aspirin 81 mg EC tablet Take 1 tablet (81 mg) by mouth. 18   Historical Provider, MD   clonazePAM (KlonoPIN) 1 mg tablet TAKE 1 TABLET BY MOUTH TWICE A DAY 3/31/25   Aron Borrego MD   fluocinonide (Lidex) 0.05 % cream Apply topically 2 times a day.  Patient taking differently: Apply topically 2 times a day as needed. 24   Aron Borrego MD   hydroCHLOROthiazide (HYDRODiuril) 50 mg tablet TAKE 1 TABLET BY MOUTH EVERY DAY 24   Aron Borrego MD   lovastatin (Mevacor) 10 mg tablet TAKE 1 TABLET BY MOUTH EVERY DAY 24   Aron Borrego MD   melatonin 10 mg tablet extended release Take by mouth.    Historical Provider, MD   metoprolol tartrate (Lopressor) 100 mg tablet TAKE 1 TABLET BY MOUTH TWICE A DAY  Patient taking differently: Take 0.5 tablets (50 mg) by mouth 2 times a day. 24   Aron Borrego MD   perindopril (Aceon) 4 mg tablet TAKE 1 TABLET BY MOUTH EVERY DAY 25   Aron Borrego MD   sildenafil (Viagra) 100 mg tablet TAKE 1/2 TO 1 TABLET 1 HOUR BEFORE SEXUAL ACTIVITY 23   Aron Borrego MD   zolpidem (Ambien) 10 mg tablet TAKE 1 TABLET (10 MG) BY MOUTH ONCE DAILY AT BEDTIME. 25   Aron Borrego MD        PAT ROS     PAT Physical Exam     Airway    Testing/Diagnostic:     Patient Specialist/PCP:     Visit Vitals  /82   Pulse 51   Temp 36.7 °C (98 °F) (Temporal)   Resp 16   Ht  "1.956 m (6' 5\")   Wt 101 kg (222 lb)   SpO2 99%   BMI 26.33 kg/m²   Smoking Status Former   BSA 2.34 m²       DASI Risk Score    No data to display       Caprini DVT Assessment      Flowsheet Row Pre-Admission Testing from 4/22/2025 in  [facility]   DVT Score (IF A SCORE IS NOT CALCULATING, MUST SELECT A BMI TO COMPLETE) 5 filed at 04/22/2025 1648   Surgical Factors Major surgery planned, including arthroscopic and laproscopic (1-2 hours) filed at 04/22/2025 1648   BMI (BMI MUST BE CHOSEN) 30 or less filed at 04/22/2025 1648          Modified Frailty Index      Flowsheet Row Pre-Admission Testing from 4/22/2025 in  [facility]   Non-independent functional status (problems with dressing, bathing, personal grooming, or cooking) 0 filed at 04/22/2025 1652   History of diabetes mellitus  0 filed at 04/22/2025 1652   History of COPD 0 filed at 04/22/2025 1652   History of CHF No filed at 04/22/2025 1652   History of MI 0 filed at 04/22/2025 1652   History of Percutaneous Coronary Intervention, Cardiac Surgery, or Angina No filed at 04/22/2025 1652   Hypertension requiring the use of medication  0.0909 filed at 04/22/2025 1652   Peripheral vascular disease 0 filed at 04/22/2025 1652   Impaired sensorium (cognitive impairement or loss, clouding, or delirium) 0 filed at 04/22/2025 1652   TIA or CVA withouy residual deficit 0 filed at 04/22/2025 1652   Cerebrovascular accident with deficit 0 filed at 04/22/2025 1652   Modified Frailty Index Calculator .0909 filed at 04/22/2025 1652          [id number] Stroke Risk Points  Current as of 8 minutes ago        N/A 0 to 9 Points:      Last Change: N/A          The [id number] risk score (Lip ARGELIA, et al. 2009. © 2010 [employer]) quantifies the risk of stroke for a patient with atrial fibrillation. For patients without atrial fibrillation or under the age of 18 this score appears as N/A. Higher score values generally indicate " higher risk of stroke.        This score is not applicable to this patient. Components are not calculated.          Revised Cardiac Risk Index      Flowsheet Row Pre-Admission Testing from 4/22/2025 in  [facility]   High-Risk Surgery (Intraperitoneal, Intrathoracic,Suprainguinal vascular) 0 filed at 04/22/2025 1651   History of ischemic heart disease (History of MI, History of positive exercuse test, Current chest paint considered due to myocardial ischemia, Use of nitrate therapy, ECG with pathological Q Waves) 0 filed at 04/22/2025 1651   History of congestive heart failure (pulmonary edemia, bilateral rales or S3 gallop, Paroxysmal nocturnal dyspnea, CXR showing pulmonary vascular redistribution) 0 filed at 04/22/2025 1651   History of cerebrovascular disease (Prior TIA or stroke) 0 filed at 04/22/2025 1651   Pre-operative insulin treatment 0 filed at 04/22/2025 1651   Pre-operative creatinine>2 mg/dl 0 filed at 04/22/2025 1651   Revised Cardiac Risk Calculator 0 filed at 04/22/2025 1651          Apfel Simplified Score      Flowsheet Row Pre-Admission Testing from 4/22/2025 in  [facility]   Smoking status 1 filed at 04/22/2025 1652   History of motion sickness or PONV  0 filed at 04/22/2025 1652   Use of postoperative opioids 1 filed at 04/22/2025 1652   Gender - Female 0=No filed at 04/22/2025 1652   Apfel Simplified Score Calculator 2 filed at 04/22/2025 1652          Risk Analysis Index Results This Encounter         4/22/2025  1653             Do you live in a place other than your own home?: 0    Any kidney failure, kidney not working well, or seeing a kidney doctor (nephrologist)? If yes, was this for kidney stones or another problem?: 0 No    Any history of chronic (long-term) congestive heart failure (CHF)?: 0 No    Any shortness of breath when resting?: 0 No    In the past five years, have you been diagnosed with or treated for cancer?: No    During the last 3 months has it  become difficult for you to remember things or organize your thoughts?: 0 No    Have you lost weight of 10 pounds or more in the past 3 months without trying?: 0 No    Do you have any loss of appetitie?: 0 No    Getting Around (Mobility): 0 Can get around without help    Eatin Can plan and prepare own meals    Toiletin Can use toilet without any help    Personal Hygiene (Bathing, Hand Washing, Changing Clothes): 0 Can shower or bathe without any help    TINAJERO Cancer History: Patient does not indicate history of cancer    Total Risk Analysis Index Score Without Cancer: 23    Total Risk Analysis Index Score: 23          Stop Bang Score      Flowsheet Row Pre-Admission Testing from 2025 in  [facility]   Do you snore loudly? 0 filed at 2025 09   Do you often feel tired or fatigued after your sleep? 1 filed at 2025 0938   Has anyone ever observed you stop breathing in your sleep? 0 filed at 2025 0938   Do you have or are you being treated for high blood pressure? 1 filed at 2025 0938   Recent BMI (Calculated) 26 filed at 2025 0938   Is BMI greater than 35 kg/m2? 0=No filed at 2025 0938   Age older than 50 years old? 1=Yes filed at 2025 0938   Is your neck circumference greater than 17 inches (Male) or 16 inches (Female)? 0 filed at 2025 0938   Gender - Male 1=Yes filed at 2025 0938   STOP-BANG Total Score 4 filed at 2025 0938          Prodigy: High Risk  Total Score: 23              Prodigy Age Score      Prodigy Gender Score     Prodigy CHF score          ARISCAT Score for Postoperative Pulmonary Complications      Flowsheet Row Pre-Admission Testing from 2025 in  [facility]   Age Calculated Score 3 filed at 2025 1653   Preoperative SpO2 0 filed at 2025 1653   Respiratory infection in the last month Either upper or lower (i.e., URI, bronchitis, pneumonia), with fever and antibiotic treatment 0 filed at  04/22/2025 1653   Preoperative anemia (Hgb less than 10 g/dl) 0 filed at 04/22/2025 1653   Surgical incision  0 filed at 04/22/2025 1653   Duration of surgery  0 filed at 04/22/2025 1653   Emergency Procedure  0 filed at 04/22/2025 1653   ARISCAT Total Score  3 filed at 04/22/2025 1653          [place] Perioperative Risk for Myocardial Infarction or Cardiac Arrest (RADHA)      Flowsheet Row Pre-Admission Testing from 4/22/2025 in  [facility]   Calculated Age Score 1.34 filed at 04/22/2025 1653   Functional Status  0 filed at 04/22/2025 1653   ASA Class  -3.29 filed at 04/22/2025 1653   Creatinine 0 filed at 04/22/2025 1653   Type of Procedure  0.80 filed at 04/22/2025 1653   RADHA Total Score  -6.4 filed at 04/22/2025 1653   RADHA % 0.17 filed at 04/22/2025 1653          1. Pre-op evaluation  CBC    Basic metabolic panel    CBC    Basic metabolic panel      2. Benign essential HTN  CBC    Basic metabolic panel    CBC    Basic metabolic panel      3. S/P coronary artery bypass graft x 3  CBC    Basic metabolic panel    CBC    Basic metabolic panel      4. Complete tear of right rotator cuff, unspecified whether traumatic  CBC    Basic metabolic panel    CBC    Basic metabolic panel         Pre-Admission Testing on 04/22/2025   Component Date Value Ref Range Status    WBC 04/22/2025 3.9 (L)  4.4 - 11.3 x10*3/uL Final    nRBC 04/22/2025 0.0  0.0 - 0.0 /100 WBCs Final    RBC 04/22/2025 4.38 (L)  4.50 - 5.90 x10*6/uL Final    Hemoglobin 04/22/2025 13.4 (L)  13.5 - 17.5 g/dL Final    Hematocrit 04/22/2025 39.7 (L)  41.0 - 52.0 % Final    MCV 04/22/2025 91  80 - 100 fL Final    MCH 04/22/2025 30.6  26.0 - 34.0 pg Final    MCHC 04/22/2025 33.8  32.0 - 36.0 g/dL Final    RDW 04/22/2025 13.1  11.5 - 14.5 % Final    Platelets 04/22/2025 130 (L)  150 - 450 x10*3/uL Final    Glucose 04/22/2025 101 (H)  74 - 99 mg/dL Final    Sodium 04/22/2025 134 (L)  136 - 145 mmol/L Final    Potassium 04/22/2025 3.6  3.5 - 5.3 mmol/L  Final    Chloride 04/22/2025 102  98 - 107 mmol/L Final    Bicarbonate 04/22/2025 28  21 - 32 mmol/L Final    Anion Gap 04/22/2025 8 (L)  10 - 20 mmol/L Final    Urea Nitrogen 04/22/2025 17  6 - 23 mg/dL Final    Creatinine 04/22/2025 0.98  0.50 - 1.30 mg/dL Final    eGFR 04/22/2025 85  >60 mL/min/1.73m*2 Final    Calculations of estimated GFR are performed using the 2021 CKD-EPI Study Refit equation without the race variable for the IDMS-Traceable creatinine methods.  https://jasn.asnjournals.org/content/early/2021/09/22/ASN.7077461578    Calcium 04/22/2025 9.1  8.6 - 10.3 mg/dL Final         Assessment and Plan:     Musculoskeletal:  pre-operative RN visit for scheduled right shoulder arthroscopy, rotator cuff repair, biceps tenodesis, extensive debridement, subacromial decompression, with partial acromioplasty with Dr. Burgess on 4/29/25.  Reviewed cardiac clearance from Dr. Pereyra on 4/17/24.  CBC WNL today except WBC 3.9 L, RBC 4.38 L, Hgb 13.4 L, Plt 130 L, and hematocrit 39.7 L.  BMP WNL today except glucose 101 H, Na 134 L, and anion gap 8 L.  The RN discussed the dosing of his medications.  Encouraged early ambulation, DVT/ PE prevention, constipation prevention, and C&DB post operatively. Patient verbalized understanding.    All surgery instructions reviewed with patient by RN. Verbalized understanding.    Reviewed Dr. Burgess's note 4/9/25 and PCP Dr. Borrego's note 1/27/25.    Tarah Borden, APRN-CNP              [1]   Past Medical History:  Diagnosis Date    Anxiety     Arteriosclerosis of coronary artery 09/05/2018    Last Assessment & Plan:    Formatting of this note might be different from the original.   Assessment: S/p CABG 2002   -Currently managed on ASA, Metoprolol, and Lovastatin    Cataract     CHF (congestive heart failure)     Clostridium difficile colitis 07/31/2023    Colonic diverticular abscess 06/14/2024    Diabetes mellitus (Multi)     Erectile dysfunction 05/25/2023    GERD  (gastroesophageal reflux disease)     Hypercholesterolemia     Hypertension     Insomnia 03/20/2020    Numbness in feet 11/11/2020    Right ankle sprain 09/23/2019   [2]   Past Surgical History:  Procedure Laterality Date    BACK SURGERY      disc    COLON SURGERY      sigmoid resection 2022    COLONOSCOPY  12/01/2019    repeat in 2024    CORONARY ARTERY BYPASS GRAFT      CT GUIDED PERCUTANEOUS PERITONEAL OR RETROPERITONEAL FLUID COLLECTION DRAINAGE  02/08/2023    CT GUIDED PERCUTANEOUS PERITONEAL OR RETROPERITONEAL FLUID COLLECTION DRAINAGE [facility]   [3]   Family History  Problem Relation Name Age of Onset    Brain cancer Mother      Heart disease Father     [4]   Allergies  Allergen Reactions    Bacitracin Rash    Neomycin-Bacitracin-Polymyxin Rash

## 2025-04-22 NOTE — PREPROCEDURE INSTRUCTIONS
Medication List            Accurate as of April 22, 2025 10:03 AM. Always use your most recent med list.                aspirin 81 mg EC tablet  Medication Adjustments for Surgery: Do Not take on the morning of surgery     clonazePAM 1 mg tablet  Commonly known as: KlonoPIN  TAKE 1 TABLET BY MOUTH TWICE A DAY  Medication Adjustments for Surgery: Do Not take on the morning of surgery     fluocinonide 0.05 % cream  Commonly known as: Lidex  Apply topically 2 times a day.  Medication Adjustments for Surgery: Do Not take on the morning of surgery     hydroCHLOROthiazide 50 mg tablet  Commonly known as: HYDRODiuril  TAKE 1 TABLET BY MOUTH EVERY DAY  Medication Adjustments for Surgery: Do Not take on the morning of surgery     lovastatin 10 mg tablet  Commonly known as: Mevacor  TAKE 1 TABLET BY MOUTH EVERY DAY  Medication Adjustments for Surgery: Do Not take on the morning of surgery     melatonin 10 mg tablet extended release  Medication Adjustments for Surgery: Do Not take on the morning of surgery     metoprolol tartrate 100 mg tablet  Commonly known as: Lopressor  TAKE 1 TABLET BY MOUTH TWICE A DAY  Medication Adjustments for Surgery: Take on the morning of surgery     perindopril 4 mg tablet  Commonly known as: Aceon  TAKE 1 TABLET BY MOUTH EVERY DAY  Medication Adjustments for Surgery: Do Not take on the morning of surgery     sildenafil 100 mg tablet  Commonly known as: Viagra  TAKE 1/2 TO 1 TABLET 1 HOUR BEFORE SEXUAL ACTIVITY  Medication Adjustments for Surgery: Do Not take on the morning of surgery     zolpidem 10 mg tablet  Commonly known as: Ambien  TAKE 1 TABLET (10 MG) BY MOUTH ONCE DAILY AT BEDTIME.  Medication Adjustments for Surgery: Do Not take on the morning of surgery                 Pre Surgical Instructions:    Do not drink any liquid after midnight the night before your surgery  Do not eat any food after midnight the night before your surgery/procedure.  Candy, gum, mints and smoking of  cigarettes, marijuana or vaping is not permitted after midnight prior to your surgery   Do not drink Alcohol 24 hours prior to surgery      Increase fluid intake day before surgery    Additional Instructions:      Review your medication instructions, take indicated medications    Wear  comfortable loose fitting clothing  Do not use moisturizers, creams, lotions or perfume  All jewelry and valuables should be left at home. May bring glasses and partials.    Stop blood thinning medications as instructed by ordering physician or surgeon    Shower or bathe the night before or day of surgery.   Brush teeth and avoid perfumes, colognes, powders, makeup, aftershave and hair spray    Go to Registration, in the main lobby, upon arrival on the day of surgery and have 's license and medical insurance card available.    Call [phone] the day before your surgery/procedure to find out what time you are to arrive the next day.     Please have a responsible adult to drive you home and be available to help you as needed after surgery.       Deep Breathing Exercises after surgery:   Breathe deeply using your lungs as fully as possible to move   secretions and clear lungs more easily.  Do a cycle of five deep breaths every hour.      Start by placing your hands on your ribs and take a deep breath in through your nose, expanding your lower chest.  You should feel your ribs push against your hands.  Breathe out slowly through your mouth until all the air is gone.    For every other breath, hold your breath for three seconds.  This will help keep the lungs fully open.     Coughing : Coughing is necessary to clear secretions that may accumulate in your lungs.  This should be done after breathing exercises.    If lying down, bend your knees and support you incision with a pillow or your hands to make it more comfortable.    If sitting, support your incision, lean forward and keep your feet on the floor.  After your five deep  breaths, breathe in and cough out sharply.  Repeat this cycle twice or for as long as you have secretions to clear.  ( You will not put your incision at risk by coughing.)    Leg Exercises:  Leg exercises are important to maintain good blood circulation in your legs, maintain muscle strength and prevent joint stiffness.  Do each exercise for 5 repetitions every hour while awake for the first few days or until you are up and walking around.         A. Pump your feet up and down at the ankles        B. With your legs straight, make circles with your feet.        C. Bend and straighten your knees by sliding your heels up and down the bed.         D. With your legs straight tighten the muscle above your knee and push the back of your knee down             Into the bed.  Hold for five seconds and then relax.  Alternate legs.

## 2025-04-22 NOTE — H&P (VIEW-ONLY)
CPM/PAT Evaluation       Name: Kamari Zhao (Kamari Zhao)  /Age: 1955/69 y.o.     In-Person       Chief Complaint: pre-operative RN visit for scheduled right shoulder arthroscopy, rotator cuff repair, biceps tenodesis, extensive debridement, subacromial decompression, with partial acromioplasty with Dr. Burgess on 25.    HPI    Medical History[1]    Surgical History[2]    Patient  has no history on file for sexual activity.    Family History[3]    Allergies[4]    Prior to Admission medications    Medication Sig Start Date End Date Taking? Authorizing Provider   aspirin 81 mg EC tablet Take 1 tablet (81 mg) by mouth. 18   Historical Provider, MD   clonazePAM (KlonoPIN) 1 mg tablet TAKE 1 TABLET BY MOUTH TWICE A DAY 3/31/25   Aron Borrego MD   fluocinonide (Lidex) 0.05 % cream Apply topically 2 times a day.  Patient taking differently: Apply topically 2 times a day as needed. 24   Aron Borrego MD   hydroCHLOROthiazide (HYDRODiuril) 50 mg tablet TAKE 1 TABLET BY MOUTH EVERY DAY 24   Aron Borrego MD   lovastatin (Mevacor) 10 mg tablet TAKE 1 TABLET BY MOUTH EVERY DAY 24   Aron Borrego MD   melatonin 10 mg tablet extended release Take by mouth.    Historical Provider, MD   metoprolol tartrate (Lopressor) 100 mg tablet TAKE 1 TABLET BY MOUTH TWICE A DAY  Patient taking differently: Take 0.5 tablets (50 mg) by mouth 2 times a day. 24   Aron Borrego MD   perindopril (Aceon) 4 mg tablet TAKE 1 TABLET BY MOUTH EVERY DAY 25   Aron Borrego MD   sildenafil (Viagra) 100 mg tablet TAKE 1/2 TO 1 TABLET 1 HOUR BEFORE SEXUAL ACTIVITY 23   Aron Borrego MD   zolpidem (Ambien) 10 mg tablet TAKE 1 TABLET (10 MG) BY MOUTH ONCE DAILY AT BEDTIME. 25   Aron Borrego MD        PAT ROS     PAT Physical Exam     Airway    Testing/Diagnostic:     Patient Specialist/PCP:     Visit Vitals  /82   Pulse 51   Temp 36.7 °C (98 °F) (Temporal)   Resp 16   Ht  "1.956 m (6' 5\")   Wt 101 kg (222 lb)   SpO2 99%   BMI 26.33 kg/m²   Smoking Status Former   BSA 2.34 m²       DASI Risk Score    No data to display       Caprini DVT Assessment      Flowsheet Row Pre-Admission Testing from 4/22/2025 in  [facility]   DVT Score (IF A SCORE IS NOT CALCULATING, MUST SELECT A BMI TO COMPLETE) 5 filed at 04/22/2025 1648   Surgical Factors Major surgery planned, including arthroscopic and laproscopic (1-2 hours) filed at 04/22/2025 1648   BMI (BMI MUST BE CHOSEN) 30 or less filed at 04/22/2025 1648          Modified Frailty Index      Flowsheet Row Pre-Admission Testing from 4/22/2025 in  [facility]   Non-independent functional status (problems with dressing, bathing, personal grooming, or cooking) 0 filed at 04/22/2025 1652   History of diabetes mellitus  0 filed at 04/22/2025 1652   History of COPD 0 filed at 04/22/2025 1652   History of CHF No filed at 04/22/2025 1652   History of MI 0 filed at 04/22/2025 1652   History of Percutaneous Coronary Intervention, Cardiac Surgery, or Angina No filed at 04/22/2025 1652   Hypertension requiring the use of medication  0.0909 filed at 04/22/2025 1652   Peripheral vascular disease 0 filed at 04/22/2025 1652   Impaired sensorium (cognitive impairement or loss, clouding, or delirium) 0 filed at 04/22/2025 1652   TIA or CVA withouy residual deficit 0 filed at 04/22/2025 1652   Cerebrovascular accident with deficit 0 filed at 04/22/2025 1652   Modified Frailty Index Calculator .0909 filed at 04/22/2025 1652          [id number] Stroke Risk Points  Current as of 8 minutes ago        N/A 0 to 9 Points:      Last Change: N/A          The [id number] risk score (Lip ARGELIA, et al. 2009. © 2010 [employer]) quantifies the risk of stroke for a patient with atrial fibrillation. For patients without atrial fibrillation or under the age of 18 this score appears as N/A. Higher score values generally indicate " higher risk of stroke.        This score is not applicable to this patient. Components are not calculated.          Revised Cardiac Risk Index      Flowsheet Row Pre-Admission Testing from 4/22/2025 in  [facility]   High-Risk Surgery (Intraperitoneal, Intrathoracic,Suprainguinal vascular) 0 filed at 04/22/2025 1651   History of ischemic heart disease (History of MI, History of positive exercuse test, Current chest paint considered due to myocardial ischemia, Use of nitrate therapy, ECG with pathological Q Waves) 0 filed at 04/22/2025 1651   History of congestive heart failure (pulmonary edemia, bilateral rales or S3 gallop, Paroxysmal nocturnal dyspnea, CXR showing pulmonary vascular redistribution) 0 filed at 04/22/2025 1651   History of cerebrovascular disease (Prior TIA or stroke) 0 filed at 04/22/2025 1651   Pre-operative insulin treatment 0 filed at 04/22/2025 1651   Pre-operative creatinine>2 mg/dl 0 filed at 04/22/2025 1651   Revised Cardiac Risk Calculator 0 filed at 04/22/2025 1651          Apfel Simplified Score      Flowsheet Row Pre-Admission Testing from 4/22/2025 in  [facility]   Smoking status 1 filed at 04/22/2025 1652   History of motion sickness or PONV  0 filed at 04/22/2025 1652   Use of postoperative opioids 1 filed at 04/22/2025 1652   Gender - Female 0=No filed at 04/22/2025 1652   Apfel Simplified Score Calculator 2 filed at 04/22/2025 1652          Risk Analysis Index Results This Encounter         4/22/2025  1653             Do you live in a place other than your own home?: 0    Any kidney failure, kidney not working well, or seeing a kidney doctor (nephrologist)? If yes, was this for kidney stones or another problem?: 0 No    Any history of chronic (long-term) congestive heart failure (CHF)?: 0 No    Any shortness of breath when resting?: 0 No    In the past five years, have you been diagnosed with or treated for cancer?: No    During the last 3 months has it  become difficult for you to remember things or organize your thoughts?: 0 No    Have you lost weight of 10 pounds or more in the past 3 months without trying?: 0 No    Do you have any loss of appetitie?: 0 No    Getting Around (Mobility): 0 Can get around without help    Eatin Can plan and prepare own meals    Toiletin Can use toilet without any help    Personal Hygiene (Bathing, Hand Washing, Changing Clothes): 0 Can shower or bathe without any help    TINAJERO Cancer History: Patient does not indicate history of cancer    Total Risk Analysis Index Score Without Cancer: 23    Total Risk Analysis Index Score: 23          Stop Bang Score      Flowsheet Row Pre-Admission Testing from 2025 in  [facility]   Do you snore loudly? 0 filed at 2025 09   Do you often feel tired or fatigued after your sleep? 1 filed at 2025 0938   Has anyone ever observed you stop breathing in your sleep? 0 filed at 2025 0938   Do you have or are you being treated for high blood pressure? 1 filed at 2025 0938   Recent BMI (Calculated) 26 filed at 2025 0938   Is BMI greater than 35 kg/m2? 0=No filed at 2025 0938   Age older than 50 years old? 1=Yes filed at 2025 0938   Is your neck circumference greater than 17 inches (Male) or 16 inches (Female)? 0 filed at 2025 0938   Gender - Male 1=Yes filed at 2025 0938   STOP-BANG Total Score 4 filed at 2025 0938          Prodigy: High Risk  Total Score: 23              Prodigy Age Score      Prodigy Gender Score     Prodigy CHF score          ARISCAT Score for Postoperative Pulmonary Complications      Flowsheet Row Pre-Admission Testing from 2025 in  [facility]   Age Calculated Score 3 filed at 2025 1653   Preoperative SpO2 0 filed at 2025 1653   Respiratory infection in the last month Either upper or lower (i.e., URI, bronchitis, pneumonia), with fever and antibiotic treatment 0 filed at  04/22/2025 1653   Preoperative anemia (Hgb less than 10 g/dl) 0 filed at 04/22/2025 1653   Surgical incision  0 filed at 04/22/2025 1653   Duration of surgery  0 filed at 04/22/2025 1653   Emergency Procedure  0 filed at 04/22/2025 1653   ARISCAT Total Score  3 filed at 04/22/2025 1653          [place] Perioperative Risk for Myocardial Infarction or Cardiac Arrest (RADHA)      Flowsheet Row Pre-Admission Testing from 4/22/2025 in  [facility]   Calculated Age Score 1.34 filed at 04/22/2025 1653   Functional Status  0 filed at 04/22/2025 1653   ASA Class  -3.29 filed at 04/22/2025 1653   Creatinine 0 filed at 04/22/2025 1653   Type of Procedure  0.80 filed at 04/22/2025 1653   RADHA Total Score  -6.4 filed at 04/22/2025 1653   RADHA % 0.17 filed at 04/22/2025 1653          1. Pre-op evaluation  CBC    Basic metabolic panel    CBC    Basic metabolic panel      2. Benign essential HTN  CBC    Basic metabolic panel    CBC    Basic metabolic panel      3. S/P coronary artery bypass graft x 3  CBC    Basic metabolic panel    CBC    Basic metabolic panel      4. Complete tear of right rotator cuff, unspecified whether traumatic  CBC    Basic metabolic panel    CBC    Basic metabolic panel         Pre-Admission Testing on 04/22/2025   Component Date Value Ref Range Status    WBC 04/22/2025 3.9 (L)  4.4 - 11.3 x10*3/uL Final    nRBC 04/22/2025 0.0  0.0 - 0.0 /100 WBCs Final    RBC 04/22/2025 4.38 (L)  4.50 - 5.90 x10*6/uL Final    Hemoglobin 04/22/2025 13.4 (L)  13.5 - 17.5 g/dL Final    Hematocrit 04/22/2025 39.7 (L)  41.0 - 52.0 % Final    MCV 04/22/2025 91  80 - 100 fL Final    MCH 04/22/2025 30.6  26.0 - 34.0 pg Final    MCHC 04/22/2025 33.8  32.0 - 36.0 g/dL Final    RDW 04/22/2025 13.1  11.5 - 14.5 % Final    Platelets 04/22/2025 130 (L)  150 - 450 x10*3/uL Final    Glucose 04/22/2025 101 (H)  74 - 99 mg/dL Final    Sodium 04/22/2025 134 (L)  136 - 145 mmol/L Final    Potassium 04/22/2025 3.6  3.5 - 5.3 mmol/L  Final    Chloride 04/22/2025 102  98 - 107 mmol/L Final    Bicarbonate 04/22/2025 28  21 - 32 mmol/L Final    Anion Gap 04/22/2025 8 (L)  10 - 20 mmol/L Final    Urea Nitrogen 04/22/2025 17  6 - 23 mg/dL Final    Creatinine 04/22/2025 0.98  0.50 - 1.30 mg/dL Final    eGFR 04/22/2025 85  >60 mL/min/1.73m*2 Final    Calculations of estimated GFR are performed using the 2021 CKD-EPI Study Refit equation without the race variable for the IDMS-Traceable creatinine methods.  https://jasn.asnjournals.org/content/early/2021/09/22/ASN.3725495047    Calcium 04/22/2025 9.1  8.6 - 10.3 mg/dL Final         Assessment and Plan:     Musculoskeletal:  pre-operative RN visit for scheduled right shoulder arthroscopy, rotator cuff repair, biceps tenodesis, extensive debridement, subacromial decompression, with partial acromioplasty with Dr. Burgess on 4/29/25.  Reviewed cardiac clearance from Dr. Pereyra on 4/17/24.  CBC WNL today except WBC 3.9 L, RBC 4.38 L, Hgb 13.4 L, Plt 130 L, and hematocrit 39.7 L.  BMP WNL today except glucose 101 H, Na 134 L, and anion gap 8 L.  The RN discussed the dosing of his medications.  Encouraged early ambulation, DVT/ PE prevention, constipation prevention, and C&DB post operatively. Patient verbalized understanding.    All surgery instructions reviewed with patient by RN. Verbalized understanding.    Reviewed Dr. Burgess's note 4/9/25 and PCP Dr. Borrego's note 1/27/25.    Tarah Borden, APRN-CNP              [1]   Past Medical History:  Diagnosis Date    Anxiety     Arteriosclerosis of coronary artery 09/05/2018    Last Assessment & Plan:    Formatting of this note might be different from the original.   Assessment: S/p CABG 2002   -Currently managed on ASA, Metoprolol, and Lovastatin    Cataract     CHF (congestive heart failure)     Clostridium difficile colitis 07/31/2023    Colonic diverticular abscess 06/14/2024    Diabetes mellitus (Multi)     Erectile dysfunction 05/25/2023    GERD  (gastroesophageal reflux disease)     Hypercholesterolemia     Hypertension     Insomnia 03/20/2020    Numbness in feet 11/11/2020    Right ankle sprain 09/23/2019   [2]   Past Surgical History:  Procedure Laterality Date    BACK SURGERY      disc    COLON SURGERY      sigmoid resection 2022    COLONOSCOPY  12/01/2019    repeat in 2024    CORONARY ARTERY BYPASS GRAFT      CT GUIDED PERCUTANEOUS PERITONEAL OR RETROPERITONEAL FLUID COLLECTION DRAINAGE  02/08/2023    CT GUIDED PERCUTANEOUS PERITONEAL OR RETROPERITONEAL FLUID COLLECTION DRAINAGE [facility]   [3]   Family History  Problem Relation Name Age of Onset    Brain cancer Mother      Heart disease Father     [4]   Allergies  Allergen Reactions    Bacitracin Rash    Neomycin-Bacitracin-Polymyxin Rash

## 2025-04-24 DIAGNOSIS — M75.121 COMPLETE TEAR OF RIGHT ROTATOR CUFF, UNSPECIFIED WHETHER TRAUMATIC: ICD-10-CM

## 2025-04-29 ENCOUNTER — APPOINTMENT (OUTPATIENT)
Dept: CARDIOLOGY | Facility: HOSPITAL | Age: 70
End: 2025-04-29
Payer: MEDICARE

## 2025-04-29 ENCOUNTER — PHARMACY VISIT (OUTPATIENT)
Dept: PHARMACY | Facility: CLINIC | Age: 70
End: 2025-04-29
Payer: COMMERCIAL

## 2025-04-29 ENCOUNTER — HOSPITAL ENCOUNTER (OUTPATIENT)
Facility: HOSPITAL | Age: 70
Setting detail: OUTPATIENT SURGERY
Discharge: HOME | End: 2025-04-29
Attending: ORTHOPAEDIC SURGERY | Admitting: ORTHOPAEDIC SURGERY
Payer: MEDICARE

## 2025-04-29 ENCOUNTER — ANESTHESIA (OUTPATIENT)
Dept: OPERATING ROOM | Facility: HOSPITAL | Age: 70
End: 2025-04-29
Payer: MEDICARE

## 2025-04-29 VITALS
RESPIRATION RATE: 13 BRPM | OXYGEN SATURATION: 96 % | TEMPERATURE: 97.2 F | HEART RATE: 60 BPM | HEIGHT: 77 IN | SYSTOLIC BLOOD PRESSURE: 164 MMHG | BODY MASS INDEX: 25.86 KG/M2 | DIASTOLIC BLOOD PRESSURE: 97 MMHG | WEIGHT: 219 LBS

## 2025-04-29 DIAGNOSIS — M75.41 IMPINGEMENT SYNDROME OF RIGHT SHOULDER: ICD-10-CM

## 2025-04-29 DIAGNOSIS — M25.511 ARTHRALGIA OF RIGHT ACROMIOCLAVICULAR JOINT: ICD-10-CM

## 2025-04-29 DIAGNOSIS — F32.A ANXIETY AND DEPRESSION: ICD-10-CM

## 2025-04-29 DIAGNOSIS — F41.9 ANXIETY AND DEPRESSION: ICD-10-CM

## 2025-04-29 DIAGNOSIS — M75.121 COMPLETE TEAR OF RIGHT ROTATOR CUFF, UNSPECIFIED WHETHER TRAUMATIC: Primary | ICD-10-CM

## 2025-04-29 DIAGNOSIS — S43.431A LABRAL TEAR OF SHOULDER, RIGHT, INITIAL ENCOUNTER: ICD-10-CM

## 2025-04-29 DIAGNOSIS — M75.111 INCOMPLETE TEAR OF RIGHT ROTATOR CUFF, UNSPECIFIED WHETHER TRAUMATIC: ICD-10-CM

## 2025-04-29 DIAGNOSIS — M75.21 BICEPS TENDINITIS OF RIGHT SHOULDER: ICD-10-CM

## 2025-04-29 LAB
ATRIAL RATE: 47 BPM
GLUCOSE BLD MANUAL STRIP-MCNC: 89 MG/DL (ref 74–99)
P AXIS: 54 DEGREES
PR INTERVAL: 209 MS
Q ONSET: 252 MS
QRS COUNT: 8 BEATS
QRS DURATION: 125 MS
QT INTERVAL: 443 MS
QTC CALCULATION(BAZETT): 392 MS
QTC FREDERICIA: 408 MS
R AXIS: 67 DEGREES
T AXIS: 23 DEGREES
T OFFSET: 473 MS
VENTRICULAR RATE: 47 BPM

## 2025-04-29 PROCEDURE — C1713 ANCHOR/SCREW BN/BN,TIS/BN: HCPCS | Performed by: ORTHOPAEDIC SURGERY

## 2025-04-29 PROCEDURE — 29826 SHO ARTHRS SRG DECOMPRESSION: CPT

## 2025-04-29 PROCEDURE — 7100000001 HC RECOVERY ROOM TIME - INITIAL BASE CHARGE: Performed by: ORTHOPAEDIC SURGERY

## 2025-04-29 PROCEDURE — 2500000005 HC RX 250 GENERAL PHARMACY W/O HCPCS

## 2025-04-29 PROCEDURE — 3700000002 HC GENERAL ANESTHESIA TIME - EACH INCREMENTAL 1 MINUTE: Performed by: ORTHOPAEDIC SURGERY

## 2025-04-29 PROCEDURE — A4649 SURGICAL SUPPLIES: HCPCS | Performed by: ORTHOPAEDIC SURGERY

## 2025-04-29 PROCEDURE — 2500000005 HC RX 250 GENERAL PHARMACY W/O HCPCS: Performed by: ANESTHESIOLOGY

## 2025-04-29 PROCEDURE — 2500000005 HC RX 250 GENERAL PHARMACY W/O HCPCS: Performed by: ORTHOPAEDIC SURGERY

## 2025-04-29 PROCEDURE — 29827 SHO ARTHRS SRG RT8TR CUF RPR: CPT | Performed by: ORTHOPAEDIC SURGERY

## 2025-04-29 PROCEDURE — 2720000007 HC OR 272 NO HCPCS: Performed by: ORTHOPAEDIC SURGERY

## 2025-04-29 PROCEDURE — 29827 SHO ARTHRS SRG RT8TR CUF RPR: CPT

## 2025-04-29 PROCEDURE — 3700000001 HC GENERAL ANESTHESIA TIME - INITIAL BASE CHARGE: Performed by: ORTHOPAEDIC SURGERY

## 2025-04-29 PROCEDURE — 29828 SHO ARTHRS SRG BICP TENODSIS: CPT

## 2025-04-29 PROCEDURE — 2500000001 HC RX 250 WO HCPCS SELF ADMINISTERED DRUGS (ALT 637 FOR MEDICARE OP): Performed by: ANESTHESIOLOGY

## 2025-04-29 PROCEDURE — 29824 SHO ARTHRS SRG DSTL CLAVICLC: CPT | Performed by: ORTHOPAEDIC SURGERY

## 2025-04-29 PROCEDURE — 3600000004 HC OR TIME - INITIAL BASE CHARGE - PROCEDURE LEVEL FOUR: Performed by: ORTHOPAEDIC SURGERY

## 2025-04-29 PROCEDURE — 29823 SHO ARTHRS SRG XTNSV DBRDMT: CPT | Performed by: ORTHOPAEDIC SURGERY

## 2025-04-29 PROCEDURE — 2780000003 HC OR 278 NO HCPCS: Performed by: ORTHOPAEDIC SURGERY

## 2025-04-29 PROCEDURE — 2500000004 HC RX 250 GENERAL PHARMACY W/ HCPCS (ALT 636 FOR OP/ED): Performed by: NURSE ANESTHETIST, CERTIFIED REGISTERED

## 2025-04-29 PROCEDURE — 2500000004 HC RX 250 GENERAL PHARMACY W/ HCPCS (ALT 636 FOR OP/ED): Mod: JZ | Performed by: ANESTHESIOLOGY

## 2025-04-29 PROCEDURE — 3600000009 HC OR TIME - EACH INCREMENTAL 1 MINUTE - PROCEDURE LEVEL FOUR: Performed by: ORTHOPAEDIC SURGERY

## 2025-04-29 PROCEDURE — 2500000004 HC RX 250 GENERAL PHARMACY W/ HCPCS (ALT 636 FOR OP/ED): Mod: JZ

## 2025-04-29 PROCEDURE — 2500000004 HC RX 250 GENERAL PHARMACY W/ HCPCS (ALT 636 FOR OP/ED): Mod: JZ | Performed by: LICENSED PRACTICAL NURSE

## 2025-04-29 PROCEDURE — 82947 ASSAY GLUCOSE BLOOD QUANT: CPT

## 2025-04-29 PROCEDURE — 29828 SHO ARTHRS SRG BICP TENODSIS: CPT | Performed by: ORTHOPAEDIC SURGERY

## 2025-04-29 PROCEDURE — 93005 ELECTROCARDIOGRAM TRACING: CPT | Mod: 59

## 2025-04-29 PROCEDURE — 29826 SHO ARTHRS SRG DECOMPRESSION: CPT | Performed by: ORTHOPAEDIC SURGERY

## 2025-04-29 PROCEDURE — 29823 SHO ARTHRS SRG XTNSV DBRDMT: CPT

## 2025-04-29 PROCEDURE — 29824 SHO ARTHRS SRG DSTL CLAVICLC: CPT

## 2025-04-29 PROCEDURE — 7100000009 HC PHASE TWO TIME - INITIAL BASE CHARGE: Performed by: ORTHOPAEDIC SURGERY

## 2025-04-29 PROCEDURE — 7100000010 HC PHASE TWO TIME - EACH INCREMENTAL 1 MINUTE: Performed by: ORTHOPAEDIC SURGERY

## 2025-04-29 PROCEDURE — 2500000004 HC RX 250 GENERAL PHARMACY W/ HCPCS (ALT 636 FOR OP/ED): Mod: JZ | Performed by: ORTHOPAEDIC SURGERY

## 2025-04-29 PROCEDURE — 7100000002 HC RECOVERY ROOM TIME - EACH INCREMENTAL 1 MINUTE: Performed by: ORTHOPAEDIC SURGERY

## 2025-04-29 PROCEDURE — RXMED WILLOW AMBULATORY MEDICATION CHARGE

## 2025-04-29 PROCEDURE — 93010 ELECTROCARDIOGRAM REPORT: CPT | Performed by: INTERNAL MEDICINE

## 2025-04-29 DEVICE — HEALIX ADVANCE BR 3 SUTURE ANCHOR W/DYNACORD TCP/PLGA ABSORBABLE ANCHOR (1) BLUE (1) WHITE/BLUE/GREEN STRIPED (1) WHITE/BLACK STRIPED, SIZE 2 (5 METRIC) DYNACORD SUTURE, 36" (91CM) 5.5MM
Type: IMPLANTABLE DEVICE | Site: SHOULDER | Status: FUNCTIONAL
Brand: HEALIX ADVANCE BR 3 SUTURE ANCHOR W/DYNACORD

## 2025-04-29 DEVICE — EXPRESSEW III AUTOCAPTURE+ LOADING TOOL/RETENTION PLATE FOR USE WITH E3AC+ FLEXIBLE SUTURE PASSER
Type: IMPLANTABLE DEVICE | Site: SHOULDER | Status: FUNCTIONAL
Brand: EXPRESSEW

## 2025-04-29 DEVICE — MILAGRO ADVANCE INTERFERENCE SCREW ABSORBABLE - TCP/PLGA 8 X 23MM
Type: IMPLANTABLE DEVICE | Site: SHOULDER | Status: FUNCTIONAL
Brand: MILAGRO

## 2025-04-29 DEVICE — IMPLANTABLE DEVICE
Type: IMPLANTABLE DEVICE | Site: SHOULDER | Status: FUNCTIONAL
Brand: VERSALOOP™ ANCHOR 3 SUTURE, 2.5MM

## 2025-04-29 RX ORDER — ACETAMINOPHEN 500 MG
1000 TABLET ORAL EVERY 8 HOURS PRN
Qty: 90 TABLET | Refills: 0 | Status: SHIPPED | OUTPATIENT
Start: 2025-04-29 | End: 2025-04-29 | Stop reason: HOSPADM

## 2025-04-29 RX ORDER — ALBUTEROL SULFATE 0.83 MG/ML
2.5 SOLUTION RESPIRATORY (INHALATION) ONCE
Status: DISCONTINUED | OUTPATIENT
Start: 2025-04-29 | End: 2025-04-29 | Stop reason: HOSPADM

## 2025-04-29 RX ORDER — CEFAZOLIN 1 G/1
INJECTION, POWDER, FOR SOLUTION INTRAVENOUS AS NEEDED
Status: DISCONTINUED | OUTPATIENT
Start: 2025-04-29 | End: 2025-04-29

## 2025-04-29 RX ORDER — ONDANSETRON HYDROCHLORIDE 2 MG/ML
4 INJECTION, SOLUTION INTRAVENOUS ONCE
Status: COMPLETED | OUTPATIENT
Start: 2025-04-29 | End: 2025-04-29

## 2025-04-29 RX ORDER — ROPIVACAINE HYDROCHLORIDE 5 MG/ML
INJECTION, SOLUTION EPIDURAL; INFILTRATION; PERINEURAL
Status: COMPLETED | OUTPATIENT
Start: 2025-04-29 | End: 2025-04-29

## 2025-04-29 RX ORDER — ASPIRIN 325 MG
325 TABLET, DELAYED RELEASE (ENTERIC COATED) ORAL 2 TIMES DAILY
Qty: 60 TABLET | Refills: 0 | Status: SHIPPED | OUTPATIENT
Start: 2025-04-29 | End: 2025-05-29

## 2025-04-29 RX ORDER — ONDANSETRON HYDROCHLORIDE 2 MG/ML
4 INJECTION, SOLUTION INTRAVENOUS ONCE AS NEEDED
Status: DISCONTINUED | OUTPATIENT
Start: 2025-04-29 | End: 2025-04-29 | Stop reason: HOSPADM

## 2025-04-29 RX ORDER — PROPOFOL 10 MG/ML
INJECTION, EMULSION INTRAVENOUS AS NEEDED
Status: DISCONTINUED | OUTPATIENT
Start: 2025-04-29 | End: 2025-04-29

## 2025-04-29 RX ORDER — OXYCODONE AND ACETAMINOPHEN 7.5; 325 MG/1; MG/1
1 TABLET ORAL EVERY 6 HOURS PRN
Qty: 28 TABLET | Refills: 0 | Status: SHIPPED | OUTPATIENT
Start: 2025-04-29 | End: 2025-04-29 | Stop reason: HOSPADM

## 2025-04-29 RX ORDER — FENTANYL CITRATE 50 UG/ML
INJECTION, SOLUTION INTRAMUSCULAR; INTRAVENOUS
Status: COMPLETED | OUTPATIENT
Start: 2025-04-29 | End: 2025-04-29

## 2025-04-29 RX ORDER — MORPHINE SULFATE 4 MG/ML
4 INJECTION INTRAVENOUS EVERY 5 MIN PRN
Status: DISCONTINUED | OUTPATIENT
Start: 2025-04-29 | End: 2025-04-29 | Stop reason: HOSPADM

## 2025-04-29 RX ORDER — NORETHINDRONE AND ETHINYL ESTRADIOL 0.5-0.035
KIT ORAL AS NEEDED
Status: DISCONTINUED | OUTPATIENT
Start: 2025-04-29 | End: 2025-04-29

## 2025-04-29 RX ORDER — MELOXICAM 15 MG/1
15 TABLET ORAL DAILY PRN
Qty: 30 TABLET | Refills: 0 | Status: SHIPPED | OUTPATIENT
Start: 2025-04-29 | End: 2025-05-29

## 2025-04-29 RX ORDER — METOCLOPRAMIDE HYDROCHLORIDE 5 MG/ML
10 INJECTION INTRAMUSCULAR; INTRAVENOUS ONCE
Status: COMPLETED | OUTPATIENT
Start: 2025-04-29 | End: 2025-04-29

## 2025-04-29 RX ORDER — LIDOCAINE HYDROCHLORIDE AND EPINEPHRINE 15; 5 MG/ML; UG/ML
INJECTION, SOLUTION EPIDURAL
Status: COMPLETED | OUTPATIENT
Start: 2025-04-29 | End: 2025-04-29

## 2025-04-29 RX ORDER — GLYCOPYRROLATE 0.2 MG/ML
INJECTION INTRAMUSCULAR; INTRAVENOUS AS NEEDED
Status: DISCONTINUED | OUTPATIENT
Start: 2025-04-29 | End: 2025-04-29

## 2025-04-29 RX ORDER — FAMOTIDINE 10 MG/ML
20 INJECTION, SOLUTION INTRAVENOUS ONCE
Status: COMPLETED | OUTPATIENT
Start: 2025-04-29 | End: 2025-04-29

## 2025-04-29 RX ORDER — GABAPENTIN 300 MG/1
300 CAPSULE ORAL NIGHTLY
Qty: 5 CAPSULE | Refills: 0 | Status: SHIPPED | OUTPATIENT
Start: 2025-04-29 | End: 2025-05-04

## 2025-04-29 RX ORDER — CEFAZOLIN SODIUM 3 G/150ML
3 INJECTION, SOLUTION INTRAVENOUS ONCE
Status: DISCONTINUED | OUTPATIENT
Start: 2025-04-29 | End: 2025-04-29 | Stop reason: HOSPADM

## 2025-04-29 RX ORDER — SODIUM CITRATE AND CITRIC ACID MONOHYDRATE 334; 500 MG/5ML; MG/5ML
30 SOLUTION ORAL ONCE
Status: COMPLETED | OUTPATIENT
Start: 2025-04-29 | End: 2025-04-29

## 2025-04-29 RX ORDER — ACETAMINOPHEN 500 MG
1000 TABLET ORAL EVERY 8 HOURS PRN
Qty: 90 TABLET | Refills: 0 | Status: SHIPPED | OUTPATIENT
Start: 2025-04-29

## 2025-04-29 RX ORDER — MORPHINE SULFATE 2 MG/ML
2 INJECTION, SOLUTION INTRAMUSCULAR; INTRAVENOUS EVERY 5 MIN PRN
Status: DISCONTINUED | OUTPATIENT
Start: 2025-04-29 | End: 2025-04-29 | Stop reason: HOSPADM

## 2025-04-29 RX ORDER — MIDAZOLAM HYDROCHLORIDE 1 MG/ML
INJECTION, SOLUTION INTRAMUSCULAR; INTRAVENOUS CONTINUOUS PRN
Status: DISCONTINUED | OUTPATIENT
Start: 2025-04-29 | End: 2025-04-29

## 2025-04-29 RX ORDER — OXYCODONE HYDROCHLORIDE 10 MG/1
5 TABLET ORAL EVERY 6 HOURS PRN
Qty: 14 TABLET | Refills: 0 | Status: SHIPPED | OUTPATIENT
Start: 2025-04-29

## 2025-04-29 RX ORDER — LIDOCAINE HCL/PF 100 MG/5ML
SYRINGE (ML) INTRAVENOUS AS NEEDED
Status: DISCONTINUED | OUTPATIENT
Start: 2025-04-29 | End: 2025-04-29

## 2025-04-29 RX ORDER — MIDAZOLAM HYDROCHLORIDE 1 MG/ML
INJECTION INTRAMUSCULAR; INTRAVENOUS
Status: COMPLETED | OUTPATIENT
Start: 2025-04-29 | End: 2025-04-29

## 2025-04-29 RX ORDER — DOCUSATE SODIUM 100 MG/1
100 CAPSULE, LIQUID FILLED ORAL 2 TIMES DAILY PRN
Qty: 28 CAPSULE | Refills: 0 | Status: SHIPPED | OUTPATIENT
Start: 2025-04-29 | End: 2025-05-13

## 2025-04-29 RX ORDER — ROCURONIUM BROMIDE 10 MG/ML
INJECTION, SOLUTION INTRAVENOUS AS NEEDED
Status: DISCONTINUED | OUTPATIENT
Start: 2025-04-29 | End: 2025-04-29

## 2025-04-29 RX ORDER — OXYCODONE HYDROCHLORIDE 5 MG/1
5 TABLET ORAL EVERY 6 HOURS PRN
Qty: 28 TABLET | Refills: 0 | Status: SHIPPED | OUTPATIENT
Start: 2025-04-29 | End: 2025-04-29 | Stop reason: HOSPADM

## 2025-04-29 RX ADMIN — LIDOCAINE HYDROCHLORIDE 100 MG: 20 INJECTION, SOLUTION INTRAVENOUS at 09:18

## 2025-04-29 RX ADMIN — SODIUM CITRATE AND CITRIC ACID MONOHYDRATE 30 ML: 500; 334 SOLUTION ORAL at 08:08

## 2025-04-29 RX ADMIN — ROPIVACAINE HYDROCHLORIDE 15 ML: 5 INJECTION, SOLUTION EPIDURAL; INFILTRATION; PERINEURAL at 08:59

## 2025-04-29 RX ADMIN — FAMOTIDINE 20 MG: 10 INJECTION, SOLUTION INTRAVENOUS at 08:09

## 2025-04-29 RX ADMIN — MIDAZOLAM HYDROCHLORIDE 2 MG: 1 INJECTION, SOLUTION INTRAMUSCULAR; INTRAVENOUS at 08:42

## 2025-04-29 RX ADMIN — Medication 5 L/MIN: at 11:22

## 2025-04-29 RX ADMIN — GLYCOPYRROLATE 0.2 MG: 0.2 INJECTION INTRAMUSCULAR; INTRAVENOUS at 08:42

## 2025-04-29 RX ADMIN — CEFAZOLIN 3 G: 1 INJECTION, POWDER, FOR SOLUTION INTRAMUSCULAR; INTRAVENOUS at 09:11

## 2025-04-29 RX ADMIN — EPHEDRINE SULFATE 10 MG: 50 INJECTION, SOLUTION INTRAVENOUS at 09:36

## 2025-04-29 RX ADMIN — Medication 3 L/MIN: at 08:09

## 2025-04-29 RX ADMIN — ONDANSETRON 4 MG: 2 INJECTION INTRAMUSCULAR; INTRAVENOUS at 08:08

## 2025-04-29 RX ADMIN — NITROGLYCERIN 0.5 INCH: 20 OINTMENT TOPICAL at 08:14

## 2025-04-29 RX ADMIN — SODIUM CHLORIDE, POTASSIUM CHLORIDE, SODIUM LACTATE AND CALCIUM CHLORIDE: 600; 310; 30; 20 INJECTION, SOLUTION INTRAVENOUS at 10:22

## 2025-04-29 RX ADMIN — DEXAMETHASONE SODIUM PHOSPHATE 8 MG: 4 INJECTION, SOLUTION INTRAMUSCULAR; INTRAVENOUS at 09:30

## 2025-04-29 RX ADMIN — METOCLOPRAMIDE 10 MG: 5 INJECTION, SOLUTION INTRAMUSCULAR; INTRAVENOUS at 08:08

## 2025-04-29 RX ADMIN — ROCURONIUM BROMIDE 50 MG: 10 INJECTION, SOLUTION INTRAVENOUS at 09:18

## 2025-04-29 RX ADMIN — LIDOCAINE HYDROCHLORIDE AND EPINEPHRINE 5 ML: 15; 5 INJECTION, SOLUTION EPIDURAL; INFILTRATION; INTRACAUDAL; PERINEURAL at 08:50

## 2025-04-29 RX ADMIN — FENTANYL CITRATE 50 MCG: 50 INJECTION INTRAMUSCULAR; INTRAVENOUS at 10:58

## 2025-04-29 RX ADMIN — SODIUM CHLORIDE, POTASSIUM CHLORIDE, SODIUM LACTATE AND CALCIUM CHLORIDE: 600; 310; 30; 20 INJECTION, SOLUTION INTRAVENOUS at 09:05

## 2025-04-29 RX ADMIN — PROPOFOL 200 MG: 10 INJECTION, EMULSION INTRAVENOUS at 09:18

## 2025-04-29 RX ADMIN — SUGAMMADEX 200 MG: 100 INJECTION, SOLUTION INTRAVENOUS at 11:06

## 2025-04-29 RX ADMIN — FENTANYL CITRATE 100 MCG: 50 INJECTION INTRAMUSCULAR; INTRAVENOUS at 08:42

## 2025-04-29 RX ADMIN — EPHEDRINE SULFATE 10 MG: 50 INJECTION, SOLUTION INTRAVENOUS at 09:39

## 2025-04-29 SDOH — HEALTH STABILITY: MENTAL HEALTH: CURRENT SMOKER: 0

## 2025-04-29 ASSESSMENT — COLUMBIA-SUICIDE SEVERITY RATING SCALE - C-SSRS
6. HAVE YOU EVER DONE ANYTHING, STARTED TO DO ANYTHING, OR PREPARED TO DO ANYTHING TO END YOUR LIFE?: NO
2. HAVE YOU ACTUALLY HAD ANY THOUGHTS OF KILLING YOURSELF?: NO
1. IN THE PAST MONTH, HAVE YOU WISHED YOU WERE DEAD OR WISHED YOU COULD GO TO SLEEP AND NOT WAKE UP?: NO

## 2025-04-29 ASSESSMENT — PAIN - FUNCTIONAL ASSESSMENT
PAIN_FUNCTIONAL_ASSESSMENT: 0-10

## 2025-04-29 ASSESSMENT — PAIN SCALES - GENERAL
PAINLEVEL_OUTOF10: 0 - NO PAIN
PAINLEVEL_OUTOF10: 6
PAINLEVEL_OUTOF10: 0 - NO PAIN

## 2025-04-29 NOTE — ANESTHESIA PROCEDURE NOTES
Airway  Date/Time: 4/29/2025 9:22 AM  Reason: elective    Airway not difficult    Staffing  Performed: GOPI   Authorized by: CHAD Simeon-GEOVANNY    Performed by: Ashu Sweeney  Patient location during procedure: OR    Patient Condition  Indications for airway management: anesthesia  Patient position: sniffing  Sedation level: deep     Final Airway Details   Preoxygenated: yes  Final airway type: endotracheal airway  Successful airway: ETT  Cuffed: yes   Successful intubation technique: direct laryngoscopy  Adjuncts used in placement: intubating stylet  Endotracheal tube insertion site: oral  Blade: Tasha  Blade size: #4  ETT size (mm): 8.0  Cormack-Lehane Classification: grade I - full view of glottis  Placement verified by: chest auscultation and capnometry   Measured from: lips  ETT to lips (cm): 23  Number of attempts at approach: 1

## 2025-04-29 NOTE — ANESTHESIA PROCEDURE NOTES
Peripheral Block    Patient location during procedure: pre-op  Medication administered at: 4/29/2025 8:46 AM  End time: 4/29/2025 9:00 AM  Reason for block: at surgeon's request and post-op pain management  Staffing  Performed: CRNA and SRNA   Authorized by: OTTONIEL Simeon    Performed by: OTTONIEL Sales  Preanesthetic Checklist  Completed: patient identified, IV checked, site marked, risks and benefits discussed, surgical consent, monitors and equipment checked, pre-op evaluation and timeout performed   Timeout performed at: 4/29/2025 8:41 AM  Peripheral Block  Patient position: sitting  Prep: ChloraPrep  Patient monitoring: heart rate, cardiac monitor and continuous pulse ox  Block type: interscalene  Laterality: right  Injection technique: single-shot  Guidance: nerve stimulator and ultrasound guided  Local infiltration: ropivacaine  Infiltration strength: 0.5 %  Dose: 15 mL  Needle  Needle gauge: 20 G  Needle localization: nerve stimulator and ultrasound guidance  Assessment  Injection assessment: negative aspiration for heme, no paresthesia on injection, incremental injection and local visualized surrounding nerve on ultrasound  Paresthesia pain: none  Heart rate change: no  Slow fractionated injection: yes  Additional Notes  Anesthesia consult was placed by Dr. Burgess for post procedural analgesia.  The patient's chart was reviewed and they were deemed an appropriate candidate for the procedure. The patient was educated in detail on the risks, benefits, and alternatives to the block including but not limited to: temporary or permanent nerve damage, bleeding, infection, damage to surrounding tissues, possible block failure and the potential for local anesthesia toxicity syndrome.  The patient expressed understanding and all questions were answered prior to the initiation of the procedure.  Informed consent was also signed by the patient and laterality determined per institutional  "policy.  A formal \"time out \"consistent with the institutions rules and regulations was performed by the anesthesia provider and appropriate RN.     Procedure  The patient was placed in the sitting position. The RIGHT side was marked and skin prep applied and allowed to dry. Proper monitors were applied with oxygen.  Sedation was provided by administering     Versed 2 mg IV  Fentanyl 100 mcg IV    A high frequency linear ultrasound probe with probe cover and sterile coupling gel was applied over the anterior neck and C-5/6/7 roots tightly located. The fascial planes between the anterior and middle scalene muscles as well as the great vessels of the neck were also identified. The probe was then positioned for a short axis view structural view, allowing for exclusion of any nearby vessels,and doppler mode was engaged to assist in this identification.   A 20G stimuplex needle was then advanced maintaining an in-plane visualization throughout the procedure, under ultrasound guidance from lateral to medial to come to rest just deep to the neurovascular sheath, but avoiding contact of the brachial plexus. A motor response was visualized from the nerve stimulator, loss of response was seen at 0.5 mAmp.  Upon negative aspiration, 15 ml 0.5% Ropivacaine with 4 mg decadron preservative free was administered safely and cautiously between the muscle planes.  Aspiration every 3-5mls was done to avoid potential intravascular injection.  All injections were done without resistance and were free of blood/ CSF.  The patient tolerated the procedure well without report of intense pain, tinnitus, metallic taste, or circumoral numbness.  The block was then evaluated after a few minutes and appeared to be functioning appropriately.     Block attempted by GOPI Sweeney.  Completed by GEOVANNY Irizarry.       Medications Administered  lidocaine-epinephrine PF (Xylocaine W/EPI) 1.5 %-1:200,000 injection - subcutaneous   5 mL - 4/29/2025 " 8:50:00 AM  ropivacaine (NAROPIN) 5 MG/ML Perineural - perineural injection   15 mL - 4/29/2025 8:59:00 AM  midazolam (VERSED) IV - intravenous   2 mg - 4/29/2025 8:42:00 AM  fentaNYL (SUBLIMAZE) IV - intravenous   100 mcg - 4/29/2025 8:42:00 AM  dexAMETHasone (Decadron) injection - perineural injection   4 mg - 4/29/2025 8:59:00 AM

## 2025-04-29 NOTE — ANESTHESIA POSTPROCEDURE EVALUATION
Patient: Kamari Zhao    Procedure Summary       Date: 04/29/25 Room / Location: [facility] 03 / [facility]    Anesthesia Start: 0904 Anesthesia Stop: 1126    Procedure: RIGHT shoulder arthroscopy, rotator cuff repair, biceps tenodesis, extensive debridement, subacromial decompression with partial acromioplasty (Right: Shoulder) Diagnosis:       Complete tear of right rotator cuff, unspecified whether traumatic      Incomplete tear of right rotator cuff, unspecified whether traumatic      Biceps tendinitis of right shoulder      Impingement syndrome of right shoulder      Labral tear of shoulder, right, initial encounter      Arthralgia of right acromioclavicular joint      (Complete tear of right rotator cuff, unspecified whether traumatic [M75.121])      (Incomplete tear of right rotator cuff, unspecified whether traumatic [M75.111])      (Biceps tendinitis of right shoulder [M75.21])      (Impingement syndrome of right shoulder [M75.41])      (Labral tear of shoulder, right, initial encounter [S43.431A])      (Arthralgia of right acromioclavicular joint [M25.511])    Surgeons: JESSICA Burgess MD Responsible Provider: OTTONIEL Simeon    Anesthesia Type: general ASA Status: 3            Anesthesia Type: general    Vitals Value Taken Time   /91 04/29/25 11:40   Temp 36.1 °C (97 °F) 04/29/25 11:22   Pulse 59 04/29/25 11:40   Resp 14 04/29/25 11:40   SpO2 100 % 04/29/25 11:40       Anesthesia Post Evaluation    Patient location during evaluation: PACU  Patient participation: complete - patient participated  Level of consciousness: awake and alert  Pain management: adequate  Multimodal analgesia pain management approach  Airway patency: patent  Cardiovascular status: hemodynamically stable  Respiratory status: room air  Hydration status: acceptable  Postoperative Nausea and Vomiting: none        There were no known notable events for this encounter.

## 2025-04-29 NOTE — ANESTHESIA PREPROCEDURE EVALUATION
Patient: Kamari Zhao    Procedure Information       Date/Time: 04/29/25 0855    Procedure: RIGHT shoulder arthroscopy, rotator cuff repair, biceps tenodesis, extensive debridement, subacromial decompression with partial acromioplasty *[employer]ite[employer] rep needed* (Right: Shoulder) - shoulder arthroscopy, rotator cuff repair, biceps tenodesis, extensive debridement, subacromial decompression with partial acromioplasty    Location: [facility] / [facility]    Surgeons: JESSICA Burgess MD            Relevant Problems   Anesthesia (within normal limits)      Cardiac   (+) Arthralgia of right acromioclavicular joint   (+) Atherosclerosis of coronary artery without angina pectoris   (+) Benign essential HTN   (+) Hypercholesterolemia      Neuro   (+) Generalized anxiety disorder      GI   (+) Gastroesophageal reflux disease with esophagitis   (+) Gastroesophageal reflux disease without esophagitis      /Renal   (+) Hyponatremia      Skin   (+) Rash       Clinical information reviewed:   Tobacco  Allergies  Meds  Problems  Med Hx  Surg Hx   Fam Hx  Soc   Hx        NPO Detail:  NPO/Void Status  Date of Last Liquid: 04/28/25  Time of Last Liquid: 1930  Date of Last Solid: 04/28/25  Time of Last Solid: 1930         Physical Exam    Airway  Mallampati: II  TM distance: >3 FB  Mouth opening: 3 or more finger widths     Cardiovascular   Rhythm: regular  Rate: abnormal     Dental - normal exam     Pulmonary - normal exam   Abdominal - normal examAbdomen: soft             Anesthesia Plan    History of general anesthesia?: yes  History of complications of general anesthesia?: no    ASA 3     general     The patient is not a current smoker.    intravenous induction   Anesthetic plan and risks discussed with patient.    Plan discussed with CRNA.

## 2025-04-29 NOTE — OP NOTE
RIGHT shoulder arthroscopy, rotator cuff repair, biceps tenodesis, extensive debridement, subacromial decompression with partial acromioplasty (R) Operative Note     Date: 2025  OR Location: [facility]    Name: Kamari Zhao, : 1955, Age: 69 y.o., MRN: [id number], Sex: male    ORTHOPEDIC OPERATIVE REPORT / POST-OP NOTE    SURGEON:  Alexis Burgess MD  ASSISTANT(S):  Melanie Livingston PA-C  PRE-OPERATIVE DIAGNOSIS: Right shoulder rotator cuff tear, biceps tendinopathy, acromioclavicular arthrosis, labral tearing, subacromial impingement  POST-OPERATIVE DIAGNOSIS:  Same  PROCEDURE: Right shoulder arthroscopy, arthroscopic rotator cuff repair of the subscapularis and supraspinatus, arthroscopic biceps tenodesis, arthroscopic distal clavicle excision, extensive debridement, subacromial decompression with partial acromioplasty  CPT CODE(S):  73842, 26465, 49378, 06857, 21418  ANESTHESIA:  general + regional  ESTIMATED BLOOD LOSS: Minimal  SPECIMEN:  None  FINDINGS:  Above  COMPLICATIONS:  None  CONDITION:  Stable to the Recovery Room      I, Dr Burgess, was present and scrubbed for the entire surgical procedure, including wound closure.     No qualified Orthopedic Resident was available to assist with this procedure.  Therefore, the assistance of Melanie Livingston PA-C, was required throughout this entire procedure.      Melanie Livingston PA-C, is specifically trained and experienced in assisting with this procedure.  During the procedure, she actively assisted Dr. Burgess in completing the operation safely and expeditiously by helping to provide exposure, maintain hemostasis, and served other technical functions, such as suturing, assisting in drilling, etc.    We will be billing for Melanie Livingston PA-C, as a required First Assistant for this procedure.      INDICATION FOR SURGERY:  69-year-old male with longstanding right shoulder pain and weakness.  We treated the patient's shoulder pain and weakness conservatively with  anti-inflammatories, physical therapy, and steroid injections.  The patient failed to have significant relief.  X-rays showed a relatively normal shoulder with no significant arthritis of the glenohumeral joint but the patient did have some acromioclavicular joint arthritis on the xrays and MRI showed a rotator cuff tear in the shoulder, as well as proximal biceps tendinopathy, acromioclavicular joint arthrosis, labral tearing and subacromial impingement.  On physical examination, the patient had pain and weakness with rotator cuff testing, positive impingement signs, and pain over their proximal biceps tendon.  The patient also had tenderness to palpation over the distal clavicle with a positive cross arm test.  We discussed continuing conservative treatment but this had not been helping and was tried already.  We discussed a shoulder arthroscopy with rotator cuff repair, arthroscopic biceps tenodesis, arthroscopic distal clavicle excision, extensive debridement and subacromial decompression with partial acromioplasty.  The patient understood all the risks versus benefits of operative and non-operative treatment options.  The risks of shoulder arthroscopy with rotator cuff repair were discussed, which included but were not limited to: risk of continued pain or re-tear of the rotator cuff, risks of infection, bleeding, nerve, artery, or muscle damage, risk of fracture either intra-operatively or post-operatively, risk of need for additional surgery, risk of anesthesia including risks of heart attack, stroke, or even death.  The patient wanted to proceed with surgery and signed appropriate surgical consents.  On the morning of surgery, I signed the patient's operative shoulder the preoperative holding area.      PROCEDURE:  The patient was brought to the operating room after anesthesia performed a block on the patient's operative upper extremity.  The patient was placed supine on the operating room table and all of  their bony prominences were padded.  A operating room huddle was performed and the patient received IV antibiotics.  The patient was placed into a beachchair position with all of their sarah prominences padded and SCDs were applied to the lower extremities and used throughout the procedure.   The patient's right upper extremity was prepped and draped in the normal sterile fashion.  A pre-incision timeout was called.  A shoulder arthroscopy was then performed, using the standard posterior viewing portal, anterior, anterolateral, and lateral working portals.  Throughout the shoulder arthroscopy, no loose bodies were identified.   In the glenohumeral joint, the cartilage of the humeral head was relatively well maintained.  There was some grade 3-4 cartilage wear of the inferior glenoid.  The patient was found to have extensive labral tearing, with anterior and posterior labral tearing, as well as a SLAP tear.  The biceps tendon was found to be erythematous and partially torn.  The biceps tendon was then tagged with a spinal needle and cut at its insertion on the labrum.  Scar tissue was seen throughout the anterior capsule.  An extensive debridement was then performed with an arthroscopic shaver, debriding the subacromial bursa as well as the labrum tearing anteriorly and posteriorly.  The biceps anchor complex tearing was debrided down to normal healthy labrum.  The biceps tendon stump was then debrided down to normal healthy labrum.  The glenoid cartilage wear was debrided with the arthroscopic shaver.  Scarring in the anterior capsule was debrided with the arthroscopic shaver and the anterior interval was opened up with the shaver.      The subscapularis tendon was then examined.  Tearing was seen in the superior aspect of the subscapularis tendon and a repair of the subscapularis was required.  The subscapularis tearing was debrided down to healthy bleeding tissue and the footprint of the subscapularis was debrided  down to healthy bleeding bone.  An all-suture anchor was impacted in the footprint of the subscapularis.  The sutures were passed through the subscapularis tendon and tied down, completing the subscapularis rotator cuff repair.  After this was performed, a probe was used and the rotator cuff repair of the subscapularis was found to be extremely stable.      The supraspinatus tendon was then examined.  A greater than 50% articular sided tear of the supraspinatus tendon was identified.  The torn tissue of the supraspinatus tendon was then debrided back to healthy bleeding tissue.    The arthroscope was then put in the subacromial bursa and a subacromial decompression was performed.  A prominent hook was found on the anterolateral aspect of the acromion, so a partial acromioplasty was performed using a hayden in the cutting block technique.  The biceps tendon was pulled in the subacromial space and freed up down to the bottom of the bicipital groove, where an 8 mm tunnel was drilled.  The biceps tendon was pushed into the tunnel and secured with interference screw, completing the arthroscopic biceps tenodesis.      Attention was then turned to the supraspinatus tendon.  The supraspinatus tendon was probed from the bursal side and found to be weak quality tissue with the probe falling through the tendon and into the glenohumeral joint.  Therefore the rotator cuff tear was completed with arthroscopic shaver.  The unhealthy supraspinatus tendon tissue was debrided back to healthy, bleeding tendon tissue with an arthroscopic shaver and the footprint of the supraspinatus was debrided down to healthy bleeding bone.  A supraspinatus rotator cuff repair was then performed using a triple loaded all suture anchor.  The sutures were passed up through the supraspinatus tendon and tied down, completing the rotator cuff repair of the supraspinatus.  After this was performed, a probe was used and the rotator cuff repair of the  supraspinatus was found to be extremely stable.  The shoulder was also put through a full range of motion and the rotator cuff repair was found to be extremely stable.    Attention was turned to the distal clavicle and AC joint.  Significant arthrosis was seen at the acromioclavicular joint so 1 cm of distal clavicle was removed with a hayden through the anterior portal.  After this was performed, the scope was put through the anterior portal, confirming that the bone had been removed superiorly as well as posteriorly and that there was 1 cm of space between the distal clavicle and the acromion.  This completed the arthroscopic distal clavicle excision.    After the shoulder arthroscopy was completed, including the rotator cuff repair, biceps tenodesis, arthroscopic distal clavicle excision, extensive debridement, and subacromial decompression with partial acromioplasty, a complete shoulder arthroscopy was then again performed and no further pathology was identified.  The shoulder was drained of fluid and the arthroscopic portals were closed with 3-0 nylon skin sutures.  Adaptic, dry sterile dressing, and a shoulder immobilizer were applied.  The patient was awoken and brought to the recovery room in good condition.  There were no complications.

## 2025-04-29 NOTE — BRIEF OP NOTE
Date: 2025  OR Location: [facility] OR    Name: Kamari Zhao, : 1955, Age: 69 y.o., MRN: [id number], Sex: male    ORTHOPEDIC OPERATIVE REPORT / POST-OP NOTE    SURGEON:  Alexis Burgess MD  ASSISTANT(S):  Melanie Livingston PA-C  PRE-OPERATIVE DIAGNOSIS: Right shoulder rotator cuff tear, biceps tendinopathy, acromioclavicular arthrosis, labral tearing, subacromial impingement  POST-OPERATIVE DIAGNOSIS:  Same  PROCEDURE: Right shoulder arthroscopy, arthroscopic rotator cuff repair of the subscapularis and supraspinatus, arthroscopic biceps tenodesis, arthroscopic distal clavicle excision, extensive debridement, subacromial decompression with partial acromioplasty  CPT CODE(S):  48486, 26733, 31591, 38317, 47681  ANESTHESIA:  general + regional  ESTIMATED BLOOD LOSS: Minimal  SPECIMEN:  None  FINDINGS:  Above  COMPLICATIONS:  None  CONDITION:  Stable to the Recovery Room      I, Dr Burgess, was present and scrubbed for the entire surgical procedure, including wound closure.     No qualified Orthopedic Resident was available to assist with this procedure.  Therefore, the assistance of Melanie Livingston PA-C, was required throughout this entire procedure.      Melanie Livingston PA-C, is specifically trained and experienced in assisting with this procedure.  During the procedure, she actively assisted Dr. Burgess in completing the operation safely and expeditiously by helping to provide exposure, maintain hemostasis, and served other technical functions, such as suturing, assisting in drilling, etc.    We will be billing for Melanie Livingston PA-C, as a required First Assistant for this procedure.      INDICATION FOR SURGERY:  69-year-old male with longstanding right shoulder pain and weakness.  We treated the patient's shoulder pain and weakness conservatively with anti-inflammatories, physical therapy, and steroid injections.  The patient failed to have significant relief.  X-rays showed a relatively normal shoulder with no  significant arthritis of the glenohumeral joint but the patient did have some acromioclavicular joint arthritis on the xrays and MRI showed a rotator cuff tear in the shoulder, as well as proximal biceps tendinopathy, acromioclavicular joint arthrosis, labral tearing and subacromial impingement.  On physical examination, the patient had pain and weakness with rotator cuff testing, positive impingement signs, and pain over their proximal biceps tendon.  The patient also had tenderness to palpation over the distal clavicle with a positive cross arm test.  We discussed continuing conservative treatment but this had not been helping and was tried already.  We discussed a shoulder arthroscopy with rotator cuff repair, arthroscopic biceps tenodesis, arthroscopic distal clavicle excision, extensive debridement and subacromial decompression with partial acromioplasty.  The patient understood all the risks versus benefits of operative and non-operative treatment options.  The risks of shoulder arthroscopy with rotator cuff repair were discussed, which included but were not limited to: risk of continued pain or re-tear of the rotator cuff, risks of infection, bleeding, nerve, artery, or muscle damage, risk of fracture either intra-operatively or post-operatively, risk of need for additional surgery, risk of anesthesia including risks of heart attack, stroke, or even death.  The patient wanted to proceed with surgery and signed appropriate surgical consents.  On the morning of surgery, I signed the patient's operative shoulder the preoperative holding area.      PROCEDURE:  The patient was brought to the operating room after anesthesia performed a block on the patient's operative upper extremity.  The patient was placed supine on the operating room table and all of their bony prominences were padded.  A operating room huddle was performed and the patient received IV antibiotics.  The patient was placed into a beachchair  position with all of their sarah prominences padded and SCDs were applied to the lower extremities and used throughout the procedure.   The patient's right upper extremity was prepped and draped in the normal sterile fashion.  A pre-incision timeout was called.  A shoulder arthroscopy was then performed, using the standard posterior viewing portal, anterior, anterolateral, and lateral working portals.  Throughout the shoulder arthroscopy, no loose bodies were identified.   In the glenohumeral joint, the cartilage of the humeral head was relatively well maintained.  There was some grade 3-4 cartilage wear of the inferior glenoid.  The patient was found to have extensive labral tearing, with anterior and posterior labral tearing, as well as a SLAP tear.  The biceps tendon was found to be erythematous and partially torn.  The biceps tendon was then tagged with a spinal needle and cut at its insertion on the labrum.  Scar tissue was seen throughout the anterior capsule.  An extensive debridement was then performed with an arthroscopic shaver, debriding the subacromial bursa as well as the labrum tearing anteriorly and posteriorly.  The biceps anchor complex tearing was debrided down to normal healthy labrum.  The biceps tendon stump was then debrided down to normal healthy labrum.  The glenoid cartilage wear was debrided with the arthroscopic shaver.  Scarring in the anterior capsule was debrided with the arthroscopic shaver and the anterior interval was opened up with the shaver.      The subscapularis tendon was then examined.  Tearing was seen in the superior aspect of the subscapularis tendon and a repair of the subscapularis was required.  The subscapularis tearing was debrided down to healthy bleeding tissue and the footprint of the subscapularis was debrided down to healthy bleeding bone.  An all-suture anchor was impacted in the footprint of the subscapularis.  The sutures were passed through the subscapularis  tendon and tied down, completing the subscapularis rotator cuff repair.  After this was performed, a probe was used and the rotator cuff repair of the subscapularis was found to be extremely stable.      The supraspinatus tendon was then examined.  A greater than 50% articular sided tear of the supraspinatus tendon was identified.  The torn tissue of the supraspinatus tendon was then debrided back to healthy bleeding tissue.    The arthroscope was then put in the subacromial bursa and a subacromial decompression was performed.  A prominent hook was found on the anterolateral aspect of the acromion, so a partial acromioplasty was performed using a hayden in the cutting block technique.  The biceps tendon was pulled in the subacromial space and freed up down to the bottom of the bicipital groove, where an 8 mm tunnel was drilled.  The biceps tendon was pushed into the tunnel and secured with interference screw, completing the arthroscopic biceps tenodesis.      Attention was then turned to the supraspinatus tendon.  The supraspinatus tendon was probed from the bursal side and found to be weak quality tissue with the probe falling through the tendon and into the glenohumeral joint.  Therefore the rotator cuff tear was completed with arthroscopic shaver.  The unhealthy supraspinatus tendon tissue was debrided back to healthy, bleeding tendon tissue with an arthroscopic shaver and the footprint of the supraspinatus was debrided down to healthy bleeding bone.  A supraspinatus rotator cuff repair was then performed using a triple loaded all suture anchor.  The sutures were passed up through the supraspinatus tendon and tied down, completing the rotator cuff repair of the supraspinatus.  After this was performed, a probe was used and the rotator cuff repair of the supraspinatus was found to be extremely stable.  The shoulder was also put through a full range of motion and the rotator cuff repair was found to be extremely  stable.    Attention was turned to the distal clavicle and AC joint.  Significant arthrosis was seen at the acromioclavicular joint so 1 cm of distal clavicle was removed with a hayden through the anterior portal.  After this was performed, the scope was put through the anterior portal, confirming that the bone had been removed superiorly as well as posteriorly and that there was 1 cm of space between the distal clavicle and the acromion.  This completed the arthroscopic distal clavicle excision.    After the shoulder arthroscopy was completed, including the rotator cuff repair, biceps tenodesis, arthroscopic distal clavicle excision, extensive debridement, and subacromial decompression with partial acromioplasty, a complete shoulder arthroscopy was then again performed and no further pathology was identified.  The shoulder was drained of fluid and the arthroscopic portals were closed with 3-0 nylon skin sutures.  Adaptic, dry sterile dressing, and a shoulder immobilizer were applied.  The patient was awoken and brought to the recovery room in good condition.  There were no complications.

## 2025-04-29 NOTE — DISCHARGE INSTRUCTIONS
Wound Care  Your dressing should remain intact and dry until your post op visit in office. No showering until seen in clinic.-sitting in bathtub to sponge bathe is ok. Place a protective cover (large garbage bag) over your shoulder (with sling on) while sponge bathing. Do NOT immerse your operative shoulder in bath or pool water.     Biceps Tenodesis  You must keep your sling on while bathing to prevent active motion of your elbow.  While sponge bathing keep your operative arm at your side, you are not to raise or reach with your arm during the first 6 weeks following surgery. Lifting or raising your arm under your own strength could cause damage to your surgically repaired shoulder.     Surgical Dressing     If your dressing becomes soiled or damp, you may remove the dressing and replace the bandage. Please do not remove steri-strips (small pieces of tape) covering your incisions, if present. Please be certain to wash hands thoroughly prior to changing dressing, do not place any ointments over incisions.     Sling/Immobilizer     Anesthesia effects can last up until 48 hours after surgery.  Please be aware that you may experience numbness in your arm for up to 48 hours after surgery.  During this time it is extremely important that you keep your sling in place at all times because you will not have control of your arm due to the anesthesia effects.  Your sling with supporting abduction pillow should be worn at all times.  Maintain your elbow position against the pillow and even with your side or in front of this position to minimize stress on the repair.      Activity     When sleeping or resting, inclined positions (i.e. reclining chair) using a pillow under the forearm for support may provide better comfort  Do not engage in activities which increase pain/swelling over the first 7-10 days following surgery  Avoid long periods of sitting (without arm supported) or long distance traveling for 2 weeks  May return to  sedentary work ONLY or school 3-4 days after surgery, if pain is tolerable     Continuous icing will help to decrease swelling and provide pain relief.  You may use ice packs every 2 hours for 20 minutes daily until your first post-operative visit.  It is very important to always have protection between the ice pad and your skin.  Never place the ice pad directly on your skin; this could lead to an injury to your skin.      Driving     Please do not drive until you are evaluated in the office after surgery.  You are considered an impaired  following surgery, and if you choose to drive, your insurance may not cover any damages that may occur.     Post-operative Medication               1. Aspirin 325mg 1 tablet twice a day for 4 weeks following surgery.  Aspirin helps to reduce the risk of blood clots following surgery.      2. Colace 1 tablet twice a day.  Colace is used to prevent constipation while taking pain medication.     3.  Mobic 15 mg take 1 tablet daily for 5 days, after 5 days take 1 tablet daily only as needed for pain.     4.Tylenol 1gm every 8 hours for 5 days, after 5 days take 1gm every 8 hours only as needed for pain.     5.Gabapentin 300mg daily at bedtime for 5 days, after 5 days you should no longer need this medication.     6. Oxy IR 5mg every 6 hours ONLY TO BE TAKEN FOR SEVERE PAIN.  Do NOT take Oxy IR within 3 hours of taking Gabapentin. Do NOT take Gabapentin within 3 hours of taking Oxy IR. If you are having severe pain and taking Oxy IR at night skip the nightly Gabapentin and only resume when you are not taking Oxy IR at night     Signs and Symptoms of Complications     Although complications are rare, the following are a list of potential symptoms you should be alert for.    Infection - Increased pain not relieved with medication, fever (temperature of 101.5 degrees Fahrenheit or higher), chills, redness, swelling or drainage (yellow/brown/green) from incision.  Blood Clot - If you  experience shortness of breath, chest pain, pain in your chest with deep breaths or difficulty breathing, please report to emergency room immediately.   Persistent Pain - Severe sharp pain not relieved by pain medication.  Persistent and increasing swelling and numbness of the arm.        If a follow-up visit after surgery was not scheduled, please call Dr. Simons office at [phone] during office business hours (Monday to Friday, 8:30am to 3:30pm) to arrange a follow-up appointment for 2 weeks after your surgery.     If you have any questions, please call Dr. Simons office at [phone] during office business hours (Monday to Friday, 8:30am to 3:30pm).  Please do not call Dr. Simons office after 3:30pm or on weekends or holidays.  If you have an urgent issue after 3:30pm or on weekends or holidays, please go immediately to a local emergency room to be evaluated.

## 2025-04-30 RX ORDER — CLONAZEPAM 1 MG/1
1 TABLET ORAL 2 TIMES DAILY
Qty: 60 TABLET | Refills: 0 | Status: SHIPPED | OUTPATIENT
Start: 2025-04-30 | End: 2025-05-30

## 2025-04-30 ASSESSMENT — PAIN SCALES - GENERAL: PAINLEVEL_OUTOF10: 0 - NO PAIN

## 2025-05-05 DIAGNOSIS — G47.00 INSOMNIA, UNSPECIFIED: ICD-10-CM

## 2025-05-05 RX ORDER — ZOLPIDEM TARTRATE 10 MG/1
10 TABLET ORAL NIGHTLY
Qty: 30 TABLET | Refills: 0 | Status: SHIPPED | OUTPATIENT
Start: 2025-05-05

## 2025-05-09 ENCOUNTER — OFFICE VISIT (OUTPATIENT)
Dept: ORTHOPEDIC SURGERY | Facility: CLINIC | Age: 70
End: 2025-05-09
Payer: MEDICARE

## 2025-05-09 VITALS — BODY MASS INDEX: 25.86 KG/M2 | WEIGHT: 219 LBS | HEIGHT: 77 IN

## 2025-05-09 DIAGNOSIS — Z98.890 STATUS POST RIGHT ROTATOR CUFF REPAIR: Primary | ICD-10-CM

## 2025-05-09 PROCEDURE — 99024 POSTOP FOLLOW-UP VISIT: CPT

## 2025-05-09 PROCEDURE — 4010F ACE/ARB THERAPY RXD/TAKEN: CPT

## 2025-05-09 PROCEDURE — 1160F RVW MEDS BY RX/DR IN RCRD: CPT

## 2025-05-09 PROCEDURE — 1036F TOBACCO NON-USER: CPT

## 2025-05-09 PROCEDURE — 3044F HG A1C LEVEL LT 7.0%: CPT

## 2025-05-09 PROCEDURE — 3062F POS MACROALBUMINURIA REV: CPT

## 2025-05-09 PROCEDURE — 1159F MED LIST DOCD IN RCRD: CPT

## 2025-05-09 PROCEDURE — 3008F BODY MASS INDEX DOCD: CPT

## 2025-05-09 PROCEDURE — 3048F LDL-C <100 MG/DL: CPT

## 2025-05-09 RX ORDER — OXYCODONE HYDROCHLORIDE 5 MG/1
5 TABLET ORAL EVERY 6 HOURS PRN
Qty: 28 TABLET | Refills: 0 | Status: SHIPPED | OUTPATIENT
Start: 2025-05-09

## 2025-05-09 RX ORDER — CYCLOBENZAPRINE HCL 10 MG
10 TABLET ORAL 2 TIMES DAILY PRN
Qty: 30 TABLET | Refills: 0 | Status: SHIPPED | OUTPATIENT
Start: 2025-05-09 | End: 2025-06-08

## 2025-05-09 ASSESSMENT — PAIN SCALES - GENERAL: PAINLEVEL_OUTOF10: 10 - WORST POSSIBLE PAIN

## 2025-05-09 ASSESSMENT — PAIN - FUNCTIONAL ASSESSMENT: PAIN_FUNCTIONAL_ASSESSMENT: 0-10

## 2025-05-09 NOTE — PATIENT INSTRUCTIONS
BMI was above normal measurement. Current weight: 99.3 kg (219 lb)  Weight change since last visit (-) denotes wt loss 0 lbs   Weight loss needed to achieve BMI 25: 8.6 Lbs  Weight loss needed to achieve BMI 30: -33.5 Lbs  Advised to Increase physical activity.

## 2025-05-09 NOTE — PROGRESS NOTES
History of Present Illness  Chief Complaint   Patient presents with    Right Shoulder - Post-op       69 y.o. male is 2 weeks status post right shoulder scope with rotator cuff repair and biceps tenodesis.  Patient reports that he is having significant pain in the shoulder.  He states that it is the entire shoulder that causes him a lot of pain.  He also notes that he has quite a bit of shoulder blade pain and pain in his neck.  Patient does have history of neck pain previously but it feels like it is getting exacerbated due to the sling.  He continues to wear the sling.  He states that he has a lot of pain anytime he tries to adjust his sling.  He also notes that he does not feel like the sling fits him very well because his wrist hangs out.  He states that it is really uncomfortable for his right wrist with it dangling.  He denies any numbness tingling or radicular symptoms that radiate down to his hand.  He denies any chest pain or shortness of breath.  No fever chills or drainage from his incisions that he endorses today.  Patient reports that he was taking oxycodone and it did help initially but after the first couple days it no longer provided him any relief so he stopped taking oxycodone.  He also tried taking meloxicam but did not notice any significant permit.  He took the gabapentin but once again did not find it to be effective for his pain.  He states Tylenol does not help his symptoms so he has not been taking it.  He is not currently taking anything for pain but reports that he is in severe pain.      Review of Systems   GENERAL: Negative for malaise, significant weight loss, fever, chills  MUSCULOSKELETAL: see HPI  NEURO:  Negative    Exam  right shoulder incisions are clean dry intact well-healed. No evidence of infection today. Stitches were removed in the office today.  There is ecchymosis noted to the anterior chest and upper arm.  It does appear to be slowly improving.  Patient has diffuse  tenderness palpation along the shoulder today.  He is also very sore to palpation over the scapula and the paravertebral muscles of the cervical spine and thoracic spine.   axillary skin patches intact today.  Patient has discomfort with gentle range of motion of the right shoulder.  No pain with range of motion of the right elbow today.   strength is reasonable on exam.  Radial pulses 2+.  Sensation neuro vas examination of the right upper extremities grossly intact today.      Assessment  Patient is 2 weeks status post right shoulder scope with rotator cuff repair and biceps tenodesis.      Plan  Overall, the patient is doing very well after their surgery and is pleased with their progress. Today we removed the stitches and applied Steri-Strips. It is okay for the patient to shower but avoid soaking the wounds (no pools or bathtubs) for 3-4 more weeks until the wounds are completely healed.  The patient can slowly increase their activity levels, but must do so slowly to avoid aggravating the joint.  We gave the patient a prescription for physical therapy to work on range of motion and strengthening of the extremity.  Patient is instructed to wear his sling until he is 6 weeks postoperative.  We discussed his pain management in great detail today.  At this time the patient is not currently taking anything for pain but reports he is having severe pain.  We talked about importance of trying to stay on top of his pain with the various pain medications that we prescribed postoperative.  At this time I gave him a refill on the oxycodone since he said he is little low.  Additionally in he should continue to take meloxicam and Tylenol intermittently for breakthrough pain when oxycodone is not relieving all of his symptoms.  Lastly we also gave the patient a short prescription of Flexeril today.  Discussed with him that this can be taken as needed to help with his shoulder pain.  I will see the patient back in 4 weeks  for recheck of his right shoulder and to discuss his shoulder pain.  He has any acute concerns and he is to call the office and we can bring him back in sooner if we need to.

## 2025-05-14 ENCOUNTER — EVALUATION (OUTPATIENT)
Dept: PHYSICAL THERAPY | Facility: CLINIC | Age: 70
End: 2025-05-14
Payer: MEDICARE

## 2025-05-14 ENCOUNTER — APPOINTMENT (OUTPATIENT)
Dept: ORTHOPEDIC SURGERY | Facility: CLINIC | Age: 70
End: 2025-05-14
Payer: MEDICARE

## 2025-05-14 DIAGNOSIS — M75.121 COMPLETE TEAR OF RIGHT ROTATOR CUFF, UNSPECIFIED WHETHER TRAUMATIC: Primary | ICD-10-CM

## 2025-05-14 PROCEDURE — 97161 PT EVAL LOW COMPLEX 20 MIN: CPT | Mod: GP | Performed by: PHYSICAL THERAPIST

## 2025-05-14 NOTE — PROGRESS NOTES
"Physical Therapy Evaluation and Treatment    Patient Name: Kamari Zhao  MRN: [id number]  Date: 5/14/2025               INSURANCE:  Visit Number: 1  Insurance Type: Payor: [employer] MEDICARE / Plan: UNITED HEALTHCARE MEDICARE / Product Type: *No Product type* / [facility] MEDICARE: EVAL ONLY/ $25 COPAY/ 100% C[place]AGE/ [place]/ [place]3900 OOP ([place])   Authorization info: Yes  Approved Visits: TBD  Insurance Auth  TBD  CMS Re-Certification Period:5/14/25 to 8/12/25    CURRENT PROBLEMS:   1. Complete tear of right rotator cuff, unspecified whether traumatic  Referral to Physical Therapy    Follow Up In Physical Therapy          PRECAUTIONS:  Surgical:  sling until 6 weeks post op  Medical:   Cardiac  Fall risk:          PMH: (pertinent to PT evaluation)  ASHD, hypertension, CABG x 3, DM, anxiety, chronic right shoulder pain, complete rotator cuff tear, biceps tendinitis right shoulder, impingement right shoulder, labral tear right shoulder  *See Epic EMR for complete list.    Medical History Form: Reviewed (scanned into chart)      HPI  per doctor note:  \" 69 y.o. male is 2 weeks status post right shoulder scope with rotator cuff repair and biceps tenodesis.  Patient reports that he is having significant pain in the shoulder.  He states that it is the entire shoulder that causes him a lot of pain.  He also notes that he has quite a bit of shoulder blade pain and pain in his neck.  Patient does have history of neck pain previously but it feels like it is getting exacerbated due to the sling.  He continues to wear the sling.  He states that he has a lot of pain anytime he tries to adjust his sling.  He also notes that he does not feel like the sling fits him very well because his wrist hangs out.  He states that it is really uncomfortable for his right wrist with it dangling.  He denies any numbness tingling or radicular symptoms that radiate down to his hand.  He denies any chest pain or shortness of breath.  No fever " "chills or drainage from his incisions that he endorses today.  Patient reports that he was taking oxycodone and it did help initially but after the first couple days it no longer provided him any relief so he stopped taking oxycodone.  He also tried taking meloxicam but did not notice any significant permit.  He took the gabapentin but once again did not find it to be effective for his pain.  He states Tylenol does not help his symptoms so he has not been taking it.  He is not currently taking anything for pain but reports that he is in severe pain.             \"    SPECIAL CONCERNS:   Having issues with pain as above    SUBJECTIVE   Shoulder surgery for rotator cuff repair on 5/4/25.    PAIN:    Current  6-7 /10  RANGE: 0 /10  to  10  /10  Location: R shoulder into the elbow but mostly the back of the shoulder.  Description:  sharp and dull  Aggravating: mornings, movement, vibrations in cars   Reducing: medicaiton     Sleep 3-4 hours    OUTCOME MEASURE  Other Measures  Other Outcome Measures: ASES R 9/30  = 70   % impaired    HOME LIVING:  The pt lives in a 1 story  with 1 steps to enter.  Lives with wife who attended PT to learn how to help pt at home    WORK/HOBBIES:   Retired last December    Likes to work on cars, motorcycles.     PRIOR LEVEL OF FUNCTION   Independent     LEARNING PREFERENCE:  Pt agreed to varied teaching methods including verbal, written (for HEP), demonstration, and monitored practice.      OBJECTIVE:  POSTURE:  Pt in sling with Forward head , Rounded  and elevated shoulders    SHOULDER:           P ROM          (in degrees)                               R   Flexion          38   Abduction     ~10   ER at neutral           Lacks neutral   IR at neutral    To body       ELBOW/WRIST   ROM:       IN DEGREES                       R   Flexion                 WFL   Extension               -40   Supination            limited   Pronation            limited   Wrist Flexion                WFL   Wrist " Extension             WFL       PALPATION:   Tightness  and tenderness throughout the R UE including anterior shoulder,   biceps, and B UT.  Large spasm in distal biceps     TREATMENTS:  Manual Therapy:   Light manual to R biceps  PROM to the R shoulder  with wife education on how to assist with PROM    EDUCATION:  Pt and wife educated in:  + current status, general goals, treatment plan  + gentle motion  + PROM  + gentle manual to arm and neck  + cold and safe use of each      ASSESSMENT  Pt is a 69 y.o. Male who presents with impairments of R shoudler  pain, edema, reduced strength, reduced ROM/flexibility, and soft tissue restriction after surgery for rotator cuff repair    These impairments have led to functional limitations including  difficulty with sleeping, reaching, lifting, dressing, bathing, housework and work out of the house    Pt would benefit from skilled physical therapy intervention to improve above impairments and facilitate return to function.    Complexity of Evaluation:   low  Based on the history including personal factors and/or comorbidities, examination of body systems including body structures and function, activity limitations, and/or participation restrictions, as well as clinical presentation, patient meets criteria for a low complexity evaluation.      Rehab potential:  good    GOALS:  Pt stated goal:  movement    Short term goals:  + Increase R shoudler ROM by 15  degrees all motions  + Reduce pain by 2 points  + Independent in a basic HEP    Long term goals:  + reduce pain to < 4/10  + Full AROM in R shoulders as needed for reaching over head  + 5/5 strength in R shoulders and scapular stabilizers for stability during functional reach and lifting  + Improved posture and independent postural correction for reduced pain and proper biomechanics for optimal shoulder function  + Independent in a HEP for self-management of symptoms.  + Reduce soft tissue restrictions for normal biomechanics  and function  + Improve function with reduced reported disability by 50% on ASES  + Able to bathe and dress using the R UE without increased pain  + Able to reach and lift as allowed by protocol without increased pain or compensation     PLAN     Skilled physical therapy 2 times per week for 12 weeks  Treatment may include:  Therapeutic Exercise (91927)  Therapeutic Activity (83514)  Manual therapy (30254)  Neuro-muscular re-education (75925)  Ultrasound (68550)  Unattended Electrical Stimulation (/75744)  Light therapy (56779)      The pt agreed with above stated treatment plan and general goals.

## 2025-05-19 ENCOUNTER — TREATMENT (OUTPATIENT)
Dept: PHYSICAL THERAPY | Facility: CLINIC | Age: 70
End: 2025-05-19
Payer: MEDICARE

## 2025-05-19 DIAGNOSIS — M75.121 COMPLETE TEAR OF RIGHT ROTATOR CUFF, UNSPECIFIED WHETHER TRAUMATIC: ICD-10-CM

## 2025-05-19 PROCEDURE — 97110 THERAPEUTIC EXERCISES: CPT | Mod: GP | Performed by: PHYSICAL THERAPIST

## 2025-05-19 PROCEDURE — 97140 MANUAL THERAPY 1/> REGIONS: CPT | Mod: GP | Performed by: PHYSICAL THERAPIST

## 2025-05-19 NOTE — PROGRESS NOTES
Physical Therapy Treatment     Patient Name: Kamari Zhao  MRN: [id number]  Today's Date: 5/19/2025  Time Calculation  Start Time: 0830  Stop Time: 0910  Time Calculation (min): 40 min       PT Therapeutic Procedures Time Entry  Manual Therapy Time Entry: 30  Therapeutic Exercise Time Entry: 10    INSURANCE:  Visit Number: 2  Insurance Type: Payor: [employer] MEDICARE / Plan: UNITED HEALTHCARE MEDICARE / Product Type: *No Product type* / [facility] MEDICARE: EVAL ONLY/ $25 COPAY/ 100% [place]AGE/ [place]/ [place]3900 OOP ([place])   Authorization info: Yes  Approved Visits: TBD  Insurance Auth  TBD  CMS Re-Certification Period:5/14/25 to 8/12/25    Current Problem  1. Complete tear of right rotator cuff, unspecified whether traumatic  Follow Up In Physical Therapy          PRECAUTIONS:  Surgical:  sling until 6 weeks post op  Medical:   Cardiac  Fall risk:    low       PMH: (pertinent to PT evaluation)  ASHD, hypertension, CABG x 3, DM, anxiety, chronic right shoulder pain, complete rotator cuff tear, biceps tendinitis right shoulder, impingement right shoulder, labral tear right shoulder  *See Epic EMR for complete list.    Medical History Form: Reviewed (scanned into chart)    PAIN  Start of Treatment: 4/10      SUBJECTIVE  The pt staes that they are doing the ROM and it is going better.  He wife feels more coftoratble with PROM     OBJECTIVE  TREATMENT:  Manual Therapy:   STM to R shoudler, and biceps  PROM to the R shoulder and biceps    Therapeutic Exercise:    Elbow extension in supine with arm supported  Supination/pronation with Elbow extension in supine with arm supported  AROM wrist Elbow extension in supine with arm supported      Home exercise program: (HEP)  PROM for elbow extension  PROM for R shoulder flexion, abduction, and ER - gentle    Education/instruction:  New:    Educated pt's wife in PROM for flexion    Prior:    current status, general goals, treatment plan   gentle motion   PROM   gentle manual to arm  and neck   cold and safe use of each    PAIN  End of session pain rating: same    ASSESSMENT:  Substantial ROM increase in the R shoulder with passive ROM.  Flexion is grossly to 90 ,  ER to neutral, and abduction greater than 30 degrees.  Swelling is still noted throughout the pecs and R shoulder.  Pt was able to manage sling with greater ease and less pain.    PT able to assist in removal of adhesive from bandaging.    Pt showed progress with:     ROM  Pt was challenged:      edema   Compliance with HEP:  excellent      Assessment of current progress against goals:    progressing    Rehab potential:  good     GOALS:  Pt stated goal:  movement     Short term goals:  + Increase R shoudler ROM by 15  degrees all motions  + Reduce pain by 2 points  + Independent in a basic HEP     Long term goals:  + reduce pain to < 4/10  + Full AROM in R shoulders as needed for reaching over head  + 5/5 strength in R shoulders and scapular stabilizers for stability during functional reach and lifting  + Improved posture and independent postural correction for reduced pain and proper biomechanics for optimal shoulder function  + Independent in a HEP for self-management of symptoms.  + Reduce soft tissue restrictions for normal biomechanics and function  + Improve function with reduced reported disability by 50% on ASES  + Able to bathe and dress using the R UE without increased pain  + Able to reach and lift as allowed by protocol without increased pain or compensation      PLAN  Skilled physical therapy 2 times per week for 12 weeks  Treatment may include:  Therapeutic Exercise (26939)  Therapeutic Activity (27989)  Manual therapy (64569)  Neuro-muscular re-education (53336)  Ultrasound (16913)  Unattended Electrical Stimulation (/26392)  Light therapy (97928)

## 2025-05-22 DIAGNOSIS — M75.121 COMPLETE TEAR OF RIGHT ROTATOR CUFF, UNSPECIFIED WHETHER TRAUMATIC: ICD-10-CM

## 2025-05-23 ENCOUNTER — TREATMENT (OUTPATIENT)
Dept: PHYSICAL THERAPY | Facility: CLINIC | Age: 70
End: 2025-05-23
Payer: MEDICARE

## 2025-05-23 DIAGNOSIS — M75.121 COMPLETE TEAR OF RIGHT ROTATOR CUFF, UNSPECIFIED WHETHER TRAUMATIC: ICD-10-CM

## 2025-05-23 PROCEDURE — 97140 MANUAL THERAPY 1/> REGIONS: CPT | Mod: GP | Performed by: PHYSICAL THERAPIST

## 2025-05-23 PROCEDURE — 97110 THERAPEUTIC EXERCISES: CPT | Mod: GP | Performed by: PHYSICAL THERAPIST

## 2025-05-23 NOTE — PROGRESS NOTES
Physical Therapy Treatment     Patient Name: Kamari Zhao  MRN: [id number]  Today's Date: 5/23/2025               INSURANCE:  Visit Number: 3  Insurance Type: Payor: [facility] MEDICARE / Plan: UNITED HEALTHCARE MEDICARE / Product Type: *No Product type* / [facility] MEDICARE: EVAL ONLY/ $25 COPAY/ 100% [place]AGE/ [place]/ [place]3900 OOP ([place])   Authorization info: Yes  Approved Visits: TBD  Insurance Auth  TBD  CMS Re-Certification Period:5/14/25 to 8/12/25    Current Problem  1. Complete tear of right rotator cuff, unspecified whether traumatic  Follow Up In Physical Therapy          PRECAUTIONS:  Surgical:  sling until 6 weeks post op  Medical:   Cardiac  Fall risk:    low       PMH: (pertinent to PT evaluation)  ASHD, hypertension, CABG x 3, DM, anxiety, chronic right shoulder pain, complete rotator cuff tear, biceps tendinitis right shoulder, impingement right shoulder, labral tear right shoulder  *See Epic EMR for complete list.    Medical History Form: Reviewed (scanned into chart)    PAIN  Start of Treatment: 7/10      SUBJECTIVE  The pt staes that they are doing the ROM but feels like they ar hitting a plateau.  They misses doing flexion one day and need a refresher on how.    He fell asleep without the brace on and hurt .      OBJECTIVE  TREATMENT:  Manual Therapy:   STM to R shoudler, and biceps  PROM to the R shoulder and biceps    Therapeutic Exercise:    Elbow extension in supine with arm supported  Supination/pronation with Elbow extension in supine with arm supported  AROM wrist Elbow extension in supine with arm supported      Home exercise program: (HEP)  PROM for elbow extension  PROM for R shoulder flexion, abduction, and ER - gentle    Education/instruction:  New:    Educated pt's wife in PROM for flexion and not to over stretch  Discussed nature of progress and not over-doing on painful times  Educated pt's wife in gentle massage  Discussed nerve entrapment  Recommended keeping a record of  progress    Prior:    current status, general goals, treatment plan   gentle motion   PROM   gentle manual to arm and neck   cold and safe use of each    PAIN  End of session pain rating: mildly better      ASSESSMENT:  The patient had significantly increased pain today and started physical therapy much more guarded with difficulty relaxing for passive range of motion.  Treatment approach was that of alternating manual with gentle range of motion including static and progressive stretching.  By the end of treatment, the patient was again able to passively flex to over 90 degrees.  With improvements in all planes.  Patient and wife ended treatment with better understanding of how to approach stretching without overdoing to cause increased pain.  Pt showed progress with:   knowledge and reduced soft tissue restriction  Pt was challenged:   pain and ROM  Compliance with HEP:  excellent      Assessment of current progress against goals:    progressing    Rehab potential:  good     GOALS:  Pt stated goal:  movement     Short term goals:  + Increase R shoudler ROM by 15  degrees all motions  + Reduce pain by 2 points  + Independent in a basic HEP     Long term goals:  + reduce pain to < 4/10  + Full AROM in R shoulders as needed for reaching over head  + 5/5 strength in R shoulders and scapular stabilizers for stability during functional reach and lifting  + Improved posture and independent postural correction for reduced pain and proper biomechanics for optimal shoulder function  + Independent in a HEP for self-management of symptoms.  + Reduce soft tissue restrictions for normal biomechanics and function  + Improve function with reduced reported disability by 50% on ASES  + Able to bathe and dress using the R UE without increased pain  + Able to reach and lift as allowed by protocol without increased pain or compensation      PLAN  Skilled physical therapy 2 times per week for 12 weeks  Treatment may  include:  Therapeutic Exercise (33322)  Therapeutic Activity (33649)  Manual therapy (37113)  Neuro-muscular re-education (60735)  Ultrasound (13440)  Unattended Electrical Stimulation (/05411)  Light therapy (78012)

## 2025-05-26 RX ORDER — ASPIRIN 325 MG
325 TABLET, DELAYED RELEASE (ENTERIC COATED) ORAL 2 TIMES DAILY
Qty: 180 TABLET | Refills: 1 | Status: SHIPPED | OUTPATIENT
Start: 2025-05-26

## 2025-05-27 ENCOUNTER — TREATMENT (OUTPATIENT)
Dept: PHYSICAL THERAPY | Facility: CLINIC | Age: 70
End: 2025-05-27
Payer: MEDICARE

## 2025-05-27 DIAGNOSIS — M75.121 COMPLETE TEAR OF RIGHT ROTATOR CUFF, UNSPECIFIED WHETHER TRAUMATIC: ICD-10-CM

## 2025-05-27 PROCEDURE — 97140 MANUAL THERAPY 1/> REGIONS: CPT | Mod: GP | Performed by: PHYSICAL THERAPIST

## 2025-05-27 PROCEDURE — 97110 THERAPEUTIC EXERCISES: CPT | Mod: GP | Performed by: PHYSICAL THERAPIST

## 2025-05-27 NOTE — PROGRESS NOTES
Physical Therapy Treatment     Patient Name: Kamari Zhao  MRN: [id number]  Today's Date: 5/27/2025  Time Calculation  Start Time: 0838  Stop Time: 0940  Time Calculation (min): 62 min       PT Therapeutic Procedures Time Entry  Manual Therapy Time Entry: 15  Therapeutic Exercise Time Entry: 30    INSURANCE:5/21/2025 24 VISITS APPROVED 05/14/2025 - 08/06/2025 2025 [facility] MEDICARE: EVAL ONLY/ $25 COPAY/ 100% C[place]AGE/ [place]/ $3900 OOP (NM) PER [facility] SITE REF# [id number]    NOTE: 3 VISITS USED FOR 2025 TO DATE  Visit Number: 4  Insurance Type: Payor: [employer] MEDICARE / Plan: UNITED HEALTHCARE MEDICARE / Product Type: *No Product type* / [facility] MEDICARE: EVAL ONLY/ $25 COPAY/ 100% C[place]AGE/ [place]/ $3900 OOP (NM)   Authorization info: Yes  Approved Visits: TBD  Insurance Auth  TBD  CMS Re-Certification Period:5/14/25 to 8/12/25    Current Problem  1. Complete tear of right rotator cuff, unspecified whether traumatic  Follow Up In Physical Therapy        PRECAUTIONS:  Surgical:  sling until 6 weeks post op  Medical:   Cardiac  Fall risk:    low       PMH: (pertinent to PT evaluation)  ASHD, hypertension, CABG x 3, DM, anxiety, chronic right shoulder pain, complete rotator cuff tear, biceps tendinitis right shoulder, impingement right shoulder, labral tear right shoulder  *See Epic EMR for complete list.    Medical History Form: Reviewed (scanned into chart)    Surgery: 4/29/25    PAIN  Start of Treatment: 5/10    SUBJECTIVE  The pt staes that he is not sleeping well.    Notes more pain with pendulum exercise.  Notes neck stiffness due to sling wear.  Pt is four weeks post op today.    OBJECTIVE  Pt able to obtain 105 degrees of flexion with pulleys.    TREATMENT:  Manual Therapy:   STM to R shoudler, and biceps  PROM to the R shoulder and biceps  Right Scapular mobilization    Therapeutic Exercise:    Elbow extension in supine with arm supported  Supination/pronation with Elbow extension in supine with  arm supported  AROM wrist Elbow extension in supine with arm supported  Attempted right shoulder dangle.  Pulleys into flexion 2 x 5 reps.  Scap Retractions.    Cold Pack to right shoulder with pt seated in chair.    Home exercise program: (HEP)  PROM for elbow extension  PROM for R shoulder flexion, abduction, and ER - gentle    Education/instruction:  Review of precautions    Prior:  Educated pt's wife in PROM for flexion and not to over stretch  Discussed nature of progress and not over-doing on painful times  Educated pt's wife in gentle massage  Discussed nerve entrapment  Recommended keeping a record of progress   current status, general goals, treatment plan   gentle motion   PROM   gentle manual to arm and neck   cold and safe use of each    PAIN  End of session pain rating: mildly better      ASSESSMENT:  The pt tolerated today's session well.  While he was guarded during PROM and some activities, he noted no significant pain increase.    Patient endorsed feeling looser after the session.    Pt showed progress with:   knowledge and reduced soft tissue restriction  Pt was challenged:   pain and ROM  Compliance with HEP:  excellent      Assessment of current progress against goals:    progressing    Rehab potential:  good     GOALS:  Pt stated goal:  movement     Short term goals:  + Increase R shoudler ROM by 15  degrees all motions  + Reduce pain by 2 points  + Independent in a basic HEP     Long term goals:  + reduce pain to < 4/10  + Full AROM in R shoulders as needed for reaching over head  + 5/5 strength in R shoulders and scapular stabilizers for stability during functional reach and lifting  + Improved posture and independent postural correction for reduced pain and proper biomechanics for optimal shoulder function  + Independent in a HEP for self-management of symptoms.  + Reduce soft tissue restrictions for normal biomechanics and function  + Improve function with reduced reported disability by 50%  on ASES  + Able to bathe and dress using the R UE without increased pain  + Able to reach and lift as allowed by protocol without increased pain or compensation      PLAN  Skilled physical therapy 2 times per week for 12 weeks  Treatment may include:  Therapeutic Exercise (72274)  Therapeutic Activity (36399)  Manual therapy (28081)  Neuro-muscular re-education (58519)  Ultrasound (22927)  Unattended Electrical Stimulation (/38762)  Light therapy (05909)

## 2025-05-28 DIAGNOSIS — F32.A ANXIETY AND DEPRESSION: ICD-10-CM

## 2025-05-28 DIAGNOSIS — F41.9 ANXIETY AND DEPRESSION: ICD-10-CM

## 2025-05-28 DIAGNOSIS — E78.00 HYPERCHOLESTEROLEMIA: ICD-10-CM

## 2025-05-28 RX ORDER — CLONAZEPAM 1 MG/1
1 TABLET ORAL 2 TIMES DAILY
Qty: 60 TABLET | Refills: 0 | Status: SHIPPED | OUTPATIENT
Start: 2025-05-28

## 2025-05-28 RX ORDER — LOVASTATIN 10 MG/1
10 TABLET ORAL DAILY
Qty: 90 TABLET | Refills: 1 | Status: SHIPPED | OUTPATIENT
Start: 2025-05-28

## 2025-05-29 ENCOUNTER — TREATMENT (OUTPATIENT)
Dept: PHYSICAL THERAPY | Facility: CLINIC | Age: 70
End: 2025-05-29
Payer: MEDICARE

## 2025-05-29 DIAGNOSIS — M75.121 COMPLETE TEAR OF RIGHT ROTATOR CUFF, UNSPECIFIED WHETHER TRAUMATIC: ICD-10-CM

## 2025-05-29 PROCEDURE — 97140 MANUAL THERAPY 1/> REGIONS: CPT | Mod: GP | Performed by: PHYSICAL THERAPIST

## 2025-05-29 PROCEDURE — 97110 THERAPEUTIC EXERCISES: CPT | Mod: GP | Performed by: PHYSICAL THERAPIST

## 2025-05-29 NOTE — PROGRESS NOTES
Physical Therapy Treatment     Patient Name: Kamari Zhao  MRN: [id number]  Today's Date: 5/29/2025  Time Calculation  Start Time: 0829  Stop Time: 0912  Time Calculation (min): 43 min       PT Therapeutic Procedures Time Entry  Manual Therapy Time Entry: 15  Therapeutic Exercise Time Entry: 28    INSURANCE:5/21/2025 24 VISITS APPROVED 05/14/2025 - 08/06/2025 2025 [facility] MEDICARE: EVAL ONLY/ $25 COPAY/ 100% C[place]AGE/ [place]/ $3900 OOP (NM) PER [facility] SITE REF# [id number]    NOTE: 3 VISITS USED FOR 2025 TO DATE  Visit Number: 4  Insurance Type: Payor: [employer] MEDICARE / Plan: UNITED HEALTHCARE MEDICARE / Product Type: *No Product type* / [facility] MEDICARE: EVAL ONLY/ $25 COPAY/ 100% C[place]AGE/ [place] V[place]/ $3900 OOP (NM)   Authorization info: Yes  Approved Visits: TBD  Insurance Auth  TBD  CMS Re-Certification Period:5/14/25 to 8/12/25    Current Problem  1. Complete tear of right rotator cuff, unspecified whether traumatic  Follow Up In Physical Therapy        PRECAUTIONS:  Surgical:  sling until 6 weeks post op  Medical:   Cardiac  Fall risk:    low       PMH: (pertinent to PT evaluation)  ASHD, hypertension, CABG x 3, DM, anxiety, chronic right shoulder pain, complete rotator cuff tear, biceps tendinitis right shoulder, impingement right shoulder, labral tear right shoulder  *See Epic EMR for complete list.    Medical History Form: Reviewed (scanned into chart)    Surgery: 4/29/25    PAIN  Start of Treatment: 5/10    SUBJECTIVE  The pt slept  for the first time last night  He is moving the elbow better and get it straighter.  He was sore after last session and took it easy yesterday.    He modeled his own pulleys and is thinking about how to model handle for TB.    He notices taht he feels better if he keeps it moving    OBJECTIVE  Pt able to obtain 115 degrees of flexion with pulleys.    TREATMENT:  Manual Therapy:   STM to R shoudler, and biceps  PROM to the R shoulder and biceps  Right Scapular  mobilization    Therapeutic Exercise:    Elbow extension in supine with arm supported  Supination/pronation with Elbow  in sitting with arm supported on table AROM  and 1#  Wrist flexion and extension in neurtral rotation 1# wt x20  AROM wrist Elbow extension in supine with arm supported  Pulleys into flexion x2 minutes  **Table glides flexion, scaption, ER x10 each    Cold Pack to right shoulder with pt seated in chair.    Home exercise program: (HEP)  PROM for elbow extension  PROM for R shoulder flexion, abduction, and ER - gentle  Table glides    Education/instruction:  Table glides added to HEP  progress    Prior:  Educated pt's wife in PROM for flexion and not to over stretch  Discussed nature of progress and not over-doing on painful times  Educated pt's wife in gentle massage  Discussed nerve entrapment  Recommended keeping a record of progress   current status, general goals, treatment plan   gentle motion   PROM   gentle manual to arm and neck   cold and safe use of each  Review of precautions    PAIN  End of session pain ratin/10      ASSESSMENT:  Excellent progress in PROM.  Able to remain passive with table glides with cues.  Wife was edcuated.  He is making progress with ability to sleep     Pt showed progress with:   knowledge and reduced soft tissue restriction  Pt was challenged:   pain and ROM  Compliance with HEP:  excellent      Assessment of current progress against goals:    progressing    Rehab potential:  good     GOALS:  Pt stated goal:  movement     Short term goals:  + Increase R shoudler ROM by 15  degrees all motions  + Reduce pain by 2 points  + Independent in a basic HEP     Long term goals:  + reduce pain to < 4/10  + Full AROM in R shoulders as needed for reaching over head  + 5/5 strength in R shoulders and scapular stabilizers for stability during functional reach and lifting  + Improved posture and independent postural correction for reduced pain and proper biomechanics for  optimal shoulder function  + Independent in a HEP for self-management of symptoms.  + Reduce soft tissue restrictions for normal biomechanics and function  + Improve function with reduced reported disability by 50% on ASES  + Able to bathe and dress using the R UE without increased pain  + Able to reach and lift as allowed by protocol without increased pain or compensation      PLAN  Skilled physical therapy 2 times per week for 12 weeks  Treatment may include:  Therapeutic Exercise (44365)  Therapeutic Activity (13030)  Manual therapy (97896)  Neuro-muscular re-education (22319)  Ultrasound (30062)  Unattended Electrical Stimulation (/46729)  Light therapy (80128)

## 2025-06-03 ENCOUNTER — TREATMENT (OUTPATIENT)
Dept: PHYSICAL THERAPY | Facility: CLINIC | Age: 70
End: 2025-06-03
Payer: MEDICARE

## 2025-06-03 DIAGNOSIS — M75.121 COMPLETE TEAR OF RIGHT ROTATOR CUFF, UNSPECIFIED WHETHER TRAUMATIC: ICD-10-CM

## 2025-06-03 PROCEDURE — 97110 THERAPEUTIC EXERCISES: CPT | Mod: GP | Performed by: PHYSICAL THERAPIST

## 2025-06-03 PROCEDURE — 97140 MANUAL THERAPY 1/> REGIONS: CPT | Mod: GP | Performed by: PHYSICAL THERAPIST

## 2025-06-03 NOTE — PROGRESS NOTES
Physical Therapy Treatment     Patient Name: Kamari Zhao  MRN: [id number]  Today's Date: 6/3/2025  Time Calculation  Start Time: 0830  Stop Time: 0915  Time Calculation (min): 45 min       PT Therapeutic Procedures Time Entry  Manual Therapy Time Entry: 15  Therapeutic Exercise Time Entry: 25    INSURANCE:   5/21/2025     VISITS APPROVED   24  Approval dates   05/14/2025 - 08/06/2025 2025 [facility] MEDICARE: EVAL ONLY/ $25 COPAY/ 100% C[place]AGE/ [place]/ $3900 OOP (NM) PER [facility] SITE REF# [id number]    NOTE: 3 VISITS USED FOR 2025 TO DATE  Visit Number: 6  Insurance Type: Payor: UNITED [employer] MEDICARE / Plan: UNITED HEALTHCARE MEDICARE / Product Type: *No Product type* / [facility] MEDICARE: EVAL ONLY/ $25 COPAY/ 100% COVERAGE/ [place] V[place]ITS/ $3900 OOP (NM)   Authorization info: Yes  CMS Re-Certification Period:5/14/25 to 8/12/25    Current Problem  1. Complete tear of right rotator cuff, unspecified whether traumatic  Follow Up In Physical Therapy        PRECAUTIONS:  Surgical:  sling until 6 weeks post op  Medical:   Cardiac  Fall risk:    low       PMH: (pertinent to PT evaluation)  ASHD, hypertension, CABG x 3, DM, anxiety, chronic right shoulder pain, complete rotator cuff tear, biceps tendinitis right shoulder, impingement right shoulder, labral tear right shoulder  *See Epic EMR for complete list.    Medical History Form: Reviewed (scanned into chart)    Surgery: 4/29/25    PAIN  Start of Treatment: 2/10    SUBJECTIVE  The pt has good and bad days, but is sleeping better now that he can take to sling off.  He is able to get his arm away from his body to wash.  He     OBJECTIVE  Pt able to obtain 140 degrees of flexion PROM  Elbow - 3 degrees    TREATMENT:  Manual Therapy:   STM to R shoudler, and biceps  PROM to the R shoulder and biceps  Right Scapular mobilization    Therapeutic Exercise:    Pulleys 3 minutes  UBE forward and back 2 minutes each    Sitting  Bicep curl with 1#  1x20  Elbow extension in supine with  arm supported  Supination/pronation with Elbow  in sitting 1#  1x20  Wrist flexion 1#  2x10  Wrist extension  1# 1x20    Supine  Triceps with  TB L1    1x20  **Tick tocks   GARTH, H, CW, CCW, alphabet    Cold Pack to right shoulder with pt seated in supine    Home exercise program: (HEP)  PROM for elbow extension  PROM for R shoulder flexion, abduction, and ER - gentle  Table glides  for flexion, scaption, ER x10 each    Education/instruction:  Progress  Shoulder anatomy  Don't push through pain      Prior:  Educated pt's wife in PROM for flexion and not to over stretch  Discussed nature of progress and not over-doing on painful times  Educated pt's wife in gentle massage  Discussed nerve entrapment  Recommended keeping a record of progress   current status, general goals, treatment plan   gentle motion   PROM   gentle manual to arm and neck   cold and safe use of each  Review of precautions  Table glides added to HEP    PAIN  End of session pain ratin/10      ASSESSMENT:  Excellent progress in PROM.  Able to progress exercise with AAROM in protected range.  Pain is reducing and pt is starting to get sleep.      Pt showed progress with:  ROM  Pt was challenged:    end range  Compliance with HEP:  excellent      Assessment of current progress against goals:    progressing    Rehab potential:  good     GOALS:  Pt stated goal:  movement     Short term goals:  + Increase R shoudler ROM by 15  degrees all motions  + Reduce pain by 2 points  + Independent in a basic HEP     Long term goals:  + reduce pain to < 4/10  + Full AROM in R shoulders as needed for reaching over head  + 5/5 strength in R shoulders and scapular stabilizers for stability during functional reach and lifting  + Improved posture and independent postural correction for reduced pain and proper biomechanics for optimal shoulder function  + Independent in a HEP for self-management of symptoms.  + Reduce soft tissue restrictions for normal biomechanics and  function  + Improve function with reduced reported disability by 50% on ASES  + Able to bathe and dress using the R UE without increased pain  + Able to reach and lift as allowed by protocol without increased pain or compensation      PLAN  RE-assess next session    Skilled physical therapy 2 times per week for 12 weeks  Treatment may include:  Therapeutic Exercise (63771)  Therapeutic Activity (90048)  Manual therapy (46850)  Neuro-muscular re-education (58579)  Ultrasound (32675)  Unattended Electrical Stimulation (/56880)  Light therapy (88769)

## 2025-06-04 DIAGNOSIS — G47.00 INSOMNIA, UNSPECIFIED: ICD-10-CM

## 2025-06-04 DIAGNOSIS — I10 BENIGN ESSENTIAL HTN: ICD-10-CM

## 2025-06-04 RX ORDER — HYDROCHLOROTHIAZIDE 50 MG/1
50 TABLET ORAL DAILY
Qty: 90 TABLET | Refills: 0 | Status: SHIPPED | OUTPATIENT
Start: 2025-06-04

## 2025-06-05 ENCOUNTER — TREATMENT (OUTPATIENT)
Dept: PHYSICAL THERAPY | Facility: CLINIC | Age: 70
End: 2025-06-05
Payer: MEDICARE

## 2025-06-05 DIAGNOSIS — M75.121 COMPLETE TEAR OF RIGHT ROTATOR CUFF, UNSPECIFIED WHETHER TRAUMATIC: ICD-10-CM

## 2025-06-05 PROCEDURE — 97110 THERAPEUTIC EXERCISES: CPT | Mod: GP | Performed by: PHYSICAL THERAPIST

## 2025-06-05 PROCEDURE — 97140 MANUAL THERAPY 1/> REGIONS: CPT | Mod: GP | Performed by: PHYSICAL THERAPIST

## 2025-06-05 RX ORDER — ZOLPIDEM TARTRATE 10 MG/1
TABLET ORAL
Qty: 30 TABLET | Refills: 0 | Status: SHIPPED | OUTPATIENT
Start: 2025-06-05

## 2025-06-05 NOTE — PROGRESS NOTES
Physical Therapy Treatment  and re-assessment    Patient Name: Kamari Zhao  MRN: [id number]  Today's Date: 6/5/2025  Time Calculation  Start Time: 0746  Stop Time: 0830  Time Calculation (min): 44 min       PT Therapeutic Procedures Time Entry  Manual Therapy Time Entry: 15  Therapeutic Exercise Time Entry: 25    INSURANCE:   5/21/2025     VISITS APPROVED   24  Approval dates   05/14/2025 - 08/06/2025 2025 [facility] MEDICARE: EVAL ONLY/ $25 COPAY/ 100% C[place]AGE/ [place]/ $3900 OOP (NM) PER [facility] SITE REF# [id number]    NOTE: 3 VISITS USED FOR 2025 TO DATE  Visit Number: 6  Insurance Type: Payor: [employer] MEDICARE / Plan: UNITED HEALTHCARE MEDICARE / Product Type: *No Product type* / [facility] MEDICARE: EVAL ONLY/ $25 COPAY/ 100% COVERAGE/ [place] VISITS/ $3900 OOP (NM)   Authorization info: Yes  CMS Re-Certification Period:5/14/25 to 8/12/25    Current Problem  1. Complete tear of right rotator cuff, unspecified whether traumatic  Follow Up In Physical Therapy        PRECAUTIONS:  Surgical:  sling until 6 weeks post op  Medical:   Cardiac  Fall risk:    low       PMH: (pertinent to PT evaluation)  ASHD, hypertension, CABG x 3, DM, anxiety, chronic right shoulder pain, complete rotator cuff tear, biceps tendinitis right shoulder, impingement right shoulder, labral tear right shoulder  *See Epic EMR for complete list.    Medical History Form: Reviewed (scanned into chart)    Surgery: 4/29/25    PAIN  Start of Treatment: 3/10  Range 0/10 to 10/10  Worse  at night and with activity  Better with hot shower and ice    SUBJECTIVE  The pt had a rough day yesterday, but the day after therapy is always hard.  He is a little more sore today.  He tried doing computer work yesterday ,but after 2 hours on the mouse it was hard.  He tried sleeping in his bed vs spare room and it was harder.      He has the most pain in the transition for straight to bent.      OBJECTIVE  POSTURE:  Pt in sling with Forward head , Rounded  and  elevated shoulders    SKIN  Irrtiation in ta axilly     SHOULDER:           P ROM          (in degrees)                                                             R   5/14/25         R   6/5/25   Flexion                 38           130   Abduction     ~10          76    ER at neutral                   Lacks neutral             25   IR at neutral    To body            To body         ELBOW/WRIST   ROM:       IN DEGREES                        R       R 6/5/25   Flexion                 WFL       141   Extension               -40         -3   Supination            limited          70   Pronation            limited         90   Wrist Flexion                WFL        WFL   Wrist Extension                        WFL        WFL         PALPATION:   Tightness  and tenderness throughout the R UE including anterior shoulder,   biceps, and B UT.  mild spasm in distal biceps       TREATMENT:  Manual Therapy:   STM to R shoudler, and biceps    Right Scapular mobilization  STM to R UT and cervcial in sitting    Therapeutic Exercise:     See objective measures  PROM to the R shoulder and biceps  Pulleys 3 minutes    ===================did not perform (DNP)=====================  Sitting  Bicep curl with 1#  1x20  Elbow extension in supine with arm supported  Supination/pronation with Elbow  in sitting 1#  1x20  Wrist flexion 1#  2x10  Wrist extension  1# 1x20    Supine  Triceps with  TB L1    1x20  **Tick tocks   V, H, CW, CCW, alphabet  ========================================================================    Home exercise program: (HEP)  PROM for elbow extension  PROM for R shoulder flexion, abduction, and ER - gentle  Table glides  for flexion, scaption, ER x10 each    Education/instruction:  Progress  Shoulder anatomy  Don't push through pain      Prior:  Educated pt's wife in PROM for flexion and not to over stretch  Discussed nature of progress and not over-doing on painful times  Educated pt's wife in gentle  massage  Discussed nerve entrapment  Recommended keeping a record of progress   current status, general goals, treatment plan   gentle motion   PROM   gentle manual to arm and neck   cold and safe use of each  Review of precautions  Table glides added to HEP    PAIN  End of session pain ratin/10      ASSESSMENT:  Increased pain today with pendulum - not tolerated    Excellent progress in PROM.  Able to progress exercise with AAROM in protected range.  Pain is reducing overall, though fluctuates greatly. He has better tolerance of therpay and is peforming HEP.  He is starting to get sleep.      Pt showed progress with:  ROM  Pt was challenged:    end range  Compliance with HEP:  excellent      Assessment of current progress against goals:    progressing    Rehab potential:  good     GOALS:  Pt stated goal:  movement     Short term goals:  + Increase R shoudler ROM by 15  degrees all motions (met)  + Reduce pain by 2 points (Improving)  + Independent in a basic HEP  ( MET)     Long term goals:  + reduce pain to < 4/10  + Full AROM in R shoulders as needed for reaching over head  + 5/5 strength in R shoulders and scapular stabilizers for stability during functional reach and lifting  + Improved posture and independent postural correction for reduced pain and proper biomechanics for optimal shoulder function  + Independent in a HEP for self-management of symptoms.  + Reduce soft tissue restrictions for normal biomechanics and function  + Improve function with reduced reported disability by 50% on ASES  + Able to bathe and dress using the R UE without increased pain  + Able to reach and lift as allowed by protocol without increased pain or compensation      PLAN  RE-assess next session    Skilled physical therapy 2 times per week for 12 weeks  Treatment may include:  Therapeutic Exercise (63412)  Therapeutic Activity (16901)  Manual therapy (43472)  Neuro-muscular re-education (25574)  Ultrasound (11258)  Unattended  Electrical Stimulation (/71848)  Light therapy (82523)

## 2025-06-06 ENCOUNTER — APPOINTMENT (OUTPATIENT)
Dept: ORTHOPEDIC SURGERY | Facility: CLINIC | Age: 70
End: 2025-06-06
Payer: MEDICARE

## 2025-06-06 ENCOUNTER — TELEPHONE (OUTPATIENT)
Dept: ORTHOPEDIC SURGERY | Facility: CLINIC | Age: 70
End: 2025-06-06

## 2025-06-06 VITALS — WEIGHT: 219 LBS | HEIGHT: 77 IN | BODY MASS INDEX: 25.86 KG/M2

## 2025-06-06 DIAGNOSIS — Z98.890 STATUS POST RIGHT ROTATOR CUFF REPAIR: Primary | ICD-10-CM

## 2025-06-06 PROCEDURE — 3044F HG A1C LEVEL LT 7.0%: CPT

## 2025-06-06 PROCEDURE — 99024 POSTOP FOLLOW-UP VISIT: CPT

## 2025-06-06 PROCEDURE — 3062F POS MACROALBUMINURIA REV: CPT

## 2025-06-06 PROCEDURE — 3048F LDL-C <100 MG/DL: CPT

## 2025-06-06 PROCEDURE — 1036F TOBACCO NON-USER: CPT

## 2025-06-06 PROCEDURE — 3008F BODY MASS INDEX DOCD: CPT

## 2025-06-06 PROCEDURE — 1159F MED LIST DOCD IN RCRD: CPT

## 2025-06-06 PROCEDURE — 4010F ACE/ARB THERAPY RXD/TAKEN: CPT

## 2025-06-06 PROCEDURE — 1160F RVW MEDS BY RX/DR IN RCRD: CPT

## 2025-06-06 RX ORDER — CYCLOBENZAPRINE HCL 10 MG
10 TABLET ORAL 2 TIMES DAILY PRN
Qty: 30 TABLET | Refills: 0 | Status: SHIPPED | OUTPATIENT
Start: 2025-06-06 | End: 2025-07-06

## 2025-06-06 RX ORDER — NALOXONE HYDROCHLORIDE 4 MG/.1ML
1 SPRAY NASAL AS NEEDED
Qty: 2 EACH | Refills: 0 | Status: SHIPPED | OUTPATIENT
Start: 2025-06-06

## 2025-06-06 ASSESSMENT — PAIN - FUNCTIONAL ASSESSMENT: PAIN_FUNCTIONAL_ASSESSMENT: NO/DENIES PAIN

## 2025-06-06 NOTE — TELEPHONE ENCOUNTER
6/6/25 rt shoulder pain  Patient called regarding his prescriptions that were to be sent to his pharmacy. States he was to get Flexeril meloxicam and oxycodone. I instructed patient typically Oxy isnt something that we fill this far out from surgery but I would check.    [facility] [place]

## 2025-06-06 NOTE — PROGRESS NOTES
History of Present Illness  Chief Complaint   Patient presents with    Right Shoulder - Follow-up       69 y.o. male is 5 weeks status post right shoulder scope with rotator cuff repair and biceps tenodesis.  Patient states that he still having quite a bit of superior and anterior shoulder pain.  He states that physical therapy is progressing.  He notes that his motion continues to improve but he states that he is really sore after physical therapy.  He also endorses that he get intermittent radiating pain.  He does have quite a bit of neck pain.  He continues to wear the sling.  He has been taking oxycodone, Tylenol and Flexeril as needed for pain.  Patient states that he is almost out of his oxycodone.  He denies any chest pain or shortness of breath.  They state pain is well controlled and continuing to improve. State they are continuing to progress well.    Review of Systems   GENERAL: Negative for malaise, significant weight loss, fever, chills  MUSCULOSKELETAL: see HPI  NEURO:  Negative    Exam  Right shoulder incisions are clean dry intact well-healed. No evidence of infection today.  Ecchymosis appears to be improving.  Patient has quite a bit of tenderness palpation over the AC joint today.  Range of motion of the right shoulder is 110 degrees forward flexion abduction, externally rotates to 40 degrees and internally rotates to the greater trochanter today.  Patient can initiate abduction.  Axillary skin patches intact today.  Overall wrist range of motion is nonirritable today.  Radial pulses 2+ bilaterally.    SILT. UE is NVI.    Assessment  Patient is 5 weeks status post right shoulder scope with rotator cuff repair with biceps tenodesis.    Plan  Discussed management with patient.  At this time discussed with patient that he should continue to progress his range of motion and physical therapy.  We discussed with him that he can discontinue his sling when he is 6 weeks postoperative so in a few days.   Since the patient continues to have significant pain I discussed with him that I can refill his oxycodone and Flexeril today.  I discussed with him this will be his last refill that he can receive from my office.  I we will also put in a referral to pain management if he does require any further pain medicine.  He can continue to take Tylenol as needed.  Patient should continue to ice and use heat therapy as needed.  I discussed with him when he feels comfortable he can start driving as long as he is not taking any of the oxycodone.  Patient understands.  All his questions were answered today.  I will plan on following up with the patient in 6 weeks for reevaluation of his right shoulder.

## 2025-06-10 ENCOUNTER — TREATMENT (OUTPATIENT)
Dept: PHYSICAL THERAPY | Facility: CLINIC | Age: 70
End: 2025-06-10
Payer: MEDICARE

## 2025-06-10 DIAGNOSIS — M75.121 COMPLETE TEAR OF RIGHT ROTATOR CUFF, UNSPECIFIED WHETHER TRAUMATIC: ICD-10-CM

## 2025-06-10 PROCEDURE — 97014 ELECTRIC STIMULATION THERAPY: CPT | Mod: GP | Performed by: PHYSICAL THERAPIST

## 2025-06-10 PROCEDURE — 97140 MANUAL THERAPY 1/> REGIONS: CPT | Mod: GP | Performed by: PHYSICAL THERAPIST

## 2025-06-10 PROCEDURE — 97110 THERAPEUTIC EXERCISES: CPT | Mod: GP | Performed by: PHYSICAL THERAPIST

## 2025-06-10 NOTE — PROGRESS NOTES
Physical Therapy Treatment     Patient Name: Kamari Zhao  MRN: [id number]  Today's Date: 6/10/2025  Time Calculation  Start Time: 0745  Stop Time: 0830  Time Calculation (min): 45 min    PT Modalities Time Entry  E-Stim (Unattended) Time Entry: 15  PT Therapeutic Procedures Time Entry  Manual Therapy Time Entry: 15  Therapeutic Exercise Time Entry: 15    INSURANCE:   5/21/2025     Visit Number: 8  VISITS APPROVED   24  Approval dates   05/14/2025 - 08/06/2025  Re-assessment on vist 7 on 6/5/25 2025 [facility] MEDICARE: EVAL ONLY/ $25 COPAY/ 100% C[place]AGE/ [place]/ $3900 OOP (NM) PER [facility] SITE REF# [id number]  NOTE: 3 VISITS USED FOR 2025 TO DATE    Insurance Type: Payor: [employer] MEDICARE / Plan: [employer] MEDICARE / Product Type: *No Product type* /  CMS Re-Certification Period:5/14/25 to 8/12/25    Current Problem  1. Complete tear of right rotator cuff, unspecified whether traumatic  Follow Up In Physical Therapy        PRECAUTIONS:  Surgical:  6 more weeks of ROM prior to strengthening    Medical:   Cardiac  Fall risk:    low       PMH: (pertinent to PT evaluation)  ASHD, hypertension, CABG x 3, DM, anxiety, chronic right shoulder pain, complete rotator cuff tear, biceps tendinitis right shoulder, impingement right shoulder, labral tear right shoulder  *See Epic EMR for complete list.    Medical History Form: Reviewed (scanned into chart)    Surgery: 4/29/25    PAIN  Start of Treatment: 4/10  Range 0/10 to 10/10  Worse  at night and with activity  Better with hot shower and ice    SUBJECTIVE  The pt stayed out of the sling all day and night yesterday and is a little more sore today.  0/10 when he woke up - sleep is still disturbed 1 hour at a time and ends up sleeping through the day in part due to boredom.     He saw the PA on Friday.  She moved the arm and feels he needs 6 more weeks of ROM prior to strengthening      OBJECTIVE  ===============Per re-assessment on  6/5/25======================  POSTURE:  Pt in sling with Forward head , Rounded  and elevated shoulders    SKIN  Irrtiation in ta axilly     SHOULDER:           P ROM          (in degrees)                                                             R   5/14/25         R   6/5/25   Flexion                 38           130   Abduction     ~10          76    ER at neutral                   Lacks neutral             25   IR at neutral    To body            To body         ELBOW/WRIST   ROM:       IN DEGREES                        R       R 6/5/25   Flexion                 WFL       141   Extension               -40         -3   Supination            limited          70   Pronation            limited         90   Wrist Flexion                WFL        WFL   Wrist Extension                        WFL        WFL         PALPATION:   Tightness  and tenderness throughout the R UE including anterior shoulder,   biceps, and B UT.  mild spasm in distal biceps   ===============================================================    TREATMENT:  Manual Therapy:   STM to R shoudler, and biceps    Right Scapular mobilization - DNP  STM to R UT and cervcial in sitting    Therapeutic Exercise:    PROM to the R shoulder and biceps  Pulleys 4 minutes  UBE  2 minutes forward and back    ===================did not perform (DNP)=====================  Sitting  Bicep curl with 1#  1x20  Elbow extension in supine with arm supported  Supination/pronation with Elbow  in sitting 1#  1x20  Wrist flexion 1#  2x10  Wrist extension  1# 1x20    Supine  Triceps with  TB L1    1x20  **Tick tocks   V, H, CW, CCW, alphabet  ========================================================================    Home exercise program: (HEP)  PROM for elbow extension  PROM for R shoulder flexion, abduction, and ER - gentle  Table glides  for flexion, scaption, ER x10 each    Education/instruction:  TENS use and parameters and electrode placement (chart  "given)  progression      Prior:  Educated pt's wife in PROM for flexion and not to over stretch  Discussed nature of progress and not over-doing on painful times  Educated pt's wife in gentle massage  Discussed nerve entrapment  Recommended keeping a record of progress   current status, general goals, treatment plan   gentle motion   PROM   gentle manual to arm and neck   cold and safe use of each  Review of precautions  Table glides added to HEP  Progress  Shoulder anatomy  Don't push through pain    PAIN  End of session pain rating: \"better\"      ASSESSMENT:  The pt had increased tightness and pain due to removing sling and recent doctor visit. Focus of therapy today was to reduced pain and stiffness.  Pt had good understanding of instructions for use of TENS.    Compliance with HEP:  excellent      Assessment of current progress against goals:    progressing    Rehab potential:  good     GOALS:  Pt stated goal:  movement     Short term goals:  + Increase R shoudler ROM by 15  degrees all motions (met)  + Reduce pain by 2 points (Improving)  + Independent in a basic HEP  ( MET)     Long term goals:  + reduce pain to < 4/10  + Full AROM in R shoulders as needed for reaching over head  + 5/5 strength in R shoulders and scapular stabilizers for stability during functional reach and lifting  + Improved posture and independent postural correction for reduced pain and proper biomechanics for optimal shoulder function  + Independent in a HEP for self-management of symptoms.  + Reduce soft tissue restrictions for normal biomechanics and function  + Improve function with reduced reported disability by 50% on ASES  + Able to bathe and dress using the R UE without increased pain  + Able to reach and lift as allowed by protocol without increased pain or compensation      PLAN  RE-assess next session    Skilled physical therapy 2 times per week for 12 weeks  Treatment may include:  Therapeutic Exercise (42818)  Therapeutic " Activity (92986)  Manual therapy (66664)  Neuro-muscular re-education (87820)  Ultrasound (09997)  Unattended Electrical Stimulation (/64791)  Light therapy (98039)

## 2025-06-12 ENCOUNTER — TREATMENT (OUTPATIENT)
Dept: PHYSICAL THERAPY | Facility: CLINIC | Age: 70
End: 2025-06-12
Payer: MEDICARE

## 2025-06-12 DIAGNOSIS — M75.121 COMPLETE TEAR OF RIGHT ROTATOR CUFF, UNSPECIFIED WHETHER TRAUMATIC: ICD-10-CM

## 2025-06-12 PROCEDURE — 97014 ELECTRIC STIMULATION THERAPY: CPT | Mod: GP | Performed by: PHYSICAL THERAPIST

## 2025-06-12 PROCEDURE — 97110 THERAPEUTIC EXERCISES: CPT | Mod: GP | Performed by: PHYSICAL THERAPIST

## 2025-06-12 PROCEDURE — 97140 MANUAL THERAPY 1/> REGIONS: CPT | Mod: GP | Performed by: PHYSICAL THERAPIST

## 2025-06-12 NOTE — PROGRESS NOTES
Physical Therapy Treatment     Patient Name: Kamari Zhao  MRN: [id number]  Today's Date: 6/12/2025  Time Calculation  Start Time: 0832            INSURANCE:   5/21/2025     Visit Number: 8  VISITS APPROVED   24  Approval dates   05/14/2025 - 08/06/2025  Re-assessment on vist 7 on 6/5/25 2025 [facility] MEDICARE: EVAL ONLY/ $25 COPAY/ 100% C[place]AGE/ [place]/ $3900 OOP (NM) PER [facility] SITE REF# [id number]  NOTE: 3 VISITS USED FOR 2025 TO DATE    Insurance Type: Payor: [employer] MEDICARE / Plan: [employer] MEDICARE / Product Type: *No Product type* /  CMS Re-Certification Period:5/14/25 to 8/12/25    Current Problem  1. Complete tear of right rotator cuff, unspecified whether traumatic  Follow Up In Physical Therapy        PRECAUTIONS:  Surgical:  6 more weeks of ROM prior to strengthening   (from 6/6/25)  Medical:   Cardiac  Fall risk:    low       PMH: (pertinent to PT evaluation)  ASHD, hypertension, CABG x 3, DM, anxiety, chronic right shoulder pain, complete rotator cuff tear, biceps tendinitis right shoulder, impingement right shoulder, labral tear right shoulder  *See Epic EMR for complete list.    Medical History Form: Reviewed (scanned into chart)    Surgery: 4/29/25    PAIN  Start of Treatment: 1-2/10    SUBJECTIVE  The pt was sore after last treatment and needed a pain pill (7-8/10)EMS did help.  His arm feels stiff.  Yesterday was a good day and he worked it a lot.      OBJECTIVE  ===============Per re-assessment on 6/5/25======================  POSTURE:  Pt in sling with Forward head , Rounded  and elevated shoulders    SKIN  Irrtiation in ta axilly     SHOULDER:           P ROM          (in degrees)                                                             R   5/14/25         R   6/5/25   Flexion                 38           130   Abduction     ~10          76    ER at neutral                   Lacks neutral             25   IR at neutral    To body            To body         ELBOW/WRIST    ROM:       IN DEGREES                        R       R 6/5/25   Flexion                 WFL       141   Extension               -40         -3   Supination            limited          70   Pronation            limited         90   Wrist Flexion                WFL        WFL   Wrist Extension                        WFL        WFL         PALPATION:   Tightness  and tenderness throughout the R UE including anterior shoulder,   biceps, and B UT.  mild spasm in distal biceps   ===============================================================    TREATMENT:  Modalities:  Interferential electric stimulation (IFC) performed to R shoulder    at 1-10 Hz with sweep for 15 minutes to strong sensation  with moist heat    Manual Therapy:   STM to R shoudler, and biceps  Right Scapular mobilization   STM to R UT and cervcial in sitting    Therapeutic Exercise:    PROM to the R shoulder and biceps  Pulleys 4 minutes  UBE  2 minutes forward and back  Supine   Tick tocks   Flexion with clasped hands  ===================did not perform (DNP)=====================  Sitting  Bicep curl with 1#  1x20  Elbow extension in supine with arm supported  Supination/pronation with Elbow  in sitting 1#  1x20  Wrist flexion 1#  2x10  Wrist extension  1# 1x20    Supine  Triceps with  TB L1    1x20  **Tick tocks   V, H, CW, CCW, alphabet  ========================================================================    Home exercise program: (HEP)  PROM for elbow extension  PROM for R shoulder flexion, abduction, and ER - gentle  Table glides  for flexion, scaption, ER x10 each    Education/instruction:  Self protraction with calsed hands in place of pendulums      Prior:  Educated pt's wife in PROM for flexion and not to over stretch  Discussed nature of progress and not over-doing on painful times  Educated pt's wife in gentle massage  Discussed nerve entrapment  Recommended keeping a record of progress   current status, general goals, treatment plan    "gentle motion   PROM   gentle manual to arm and neck   cold and safe use of each  Review of precautions  Table glides added to HEP  Progress  Shoulder anatomy  Don't push through pain  TENS use and parameters and electrode placement (chart given)  progression    PAIN  End of session pain rating: \"better\"      ASSESSMENT:  The pt was very tritan upper trap today addressed with manual prior to shoulder exercises.  He also had tightness throughout with scapular gliding improved with practicing assistance.  Performing these activities prior to passive range of motion allowed for greater comfort with range of motion.  Exercises kept in slightly lower range for comfort.    Compliance with HEP:  excellent      Assessment of current progress against goals:    progressing    Rehab potential:  good     GOALS:  Pt stated goal:  movement     Short term goals:  + Increase R shoudler ROM by 15  degrees all motions (met)  + Reduce pain by 2 points (Improving)  + Independent in a basic HEP  ( MET)     Long term goals:  + reduce pain to < 4/10  + Full AROM in R shoulders as needed for reaching over head  + 5/5 strength in R shoulders and scapular stabilizers for stability during functional reach and lifting  + Improved posture and independent postural correction for reduced pain and proper biomechanics for optimal shoulder function  + Independent in a HEP for self-management of symptoms.  + Reduce soft tissue restrictions for normal biomechanics and function  + Improve function with reduced reported disability by 50% on ASES  + Able to bathe and dress using the R UE without increased pain  + Able to reach and lift as allowed by protocol without increased pain or compensation      PLAN  Skilled physical therapy 2 times per week for 12 weeks  Treatment may include:  Therapeutic Exercise (85134)  Therapeutic Activity (91341)  Manual therapy (47322)  Neuro-muscular re-education (80561)  Ultrasound (19612)  Unattended Electrical " Stimulation (/48549)  Light therapy (10525)

## 2025-06-13 DIAGNOSIS — I10 HYPERTENSION, UNSPECIFIED TYPE: ICD-10-CM

## 2025-06-13 RX ORDER — METOPROLOL TARTRATE 100 MG/1
50 TABLET ORAL 2 TIMES DAILY
Qty: 90 TABLET | Refills: 3 | Status: SHIPPED | OUTPATIENT
Start: 2025-06-13

## 2025-06-17 ENCOUNTER — TREATMENT (OUTPATIENT)
Dept: PHYSICAL THERAPY | Facility: CLINIC | Age: 70
End: 2025-06-17
Payer: MEDICARE

## 2025-06-17 DIAGNOSIS — M75.121 COMPLETE TEAR OF RIGHT ROTATOR CUFF, UNSPECIFIED WHETHER TRAUMATIC: ICD-10-CM

## 2025-06-17 PROCEDURE — 97014 ELECTRIC STIMULATION THERAPY: CPT | Mod: GP | Performed by: PHYSICAL THERAPIST

## 2025-06-17 PROCEDURE — 97140 MANUAL THERAPY 1/> REGIONS: CPT | Mod: GP | Performed by: PHYSICAL THERAPIST

## 2025-06-17 PROCEDURE — 97110 THERAPEUTIC EXERCISES: CPT | Mod: GP | Performed by: PHYSICAL THERAPIST

## 2025-06-17 NOTE — PROGRESS NOTES
Physical Therapy Treatment     Patient Name: Kamari Zhao  MRN: [id number]  Today's Date: 6/17/2025  Time Calculation  Start Time: 0830  Stop Time: 0915  Time Calculation (min): 45 min    PT Modalities Time Entry  E-Stim (Unattended) Time Entry: 13  PT Therapeutic Procedures Time Entry  Therapeutic Exercise Time Entry: 15  Manual Therapy Time Entry: 15    INSURANCE:   5/21/2025     Visit Number: 10  VISITS APPROVED   24  Approval dates   05/14/2025 - 08/06/2025  Re-assessment on vist 7 on 6/5/25 2025 [facility] MEDICARE: EVAL ONLY/ $25 COPAY/ 100% COVERAGE/ [place]ITS/ $3900 OOP (NM) PER [facility] SITE REF# [id number]  NOTE: 3 VISITS USED FOR 2025 TO DATE    Insurance Type: Payor: [employer] MEDICARE / Plan: [employer] MEDICARE / Product Type: *No Product type* /  CMS Re-Certification Period:5/14/25 to 8/12/25    Current Problem  1. Complete tear of right rotator cuff, unspecified whether traumatic  Follow Up In Physical Therapy          PRECAUTIONS:  Surgical:  6 more weeks of ROM prior to strengthening   (from 6/6/25)  Medical:   Cardiac  Fall risk:    low       PMH: (pertinent to PT evaluation)  ASHD, hypertension, CABG x 3, DM, anxiety, chronic right shoulder pain, complete rotator cuff tear, biceps tendinitis right shoulder, impingement right shoulder, labral tear right shoulder  *See Epic EMR for complete list.    Medical History Form: Reviewed (scanned into chart)    Surgery: 4/29/25    PAIN  Start of Treatment: 1-2/10    SUBJECTIVE  The pt has very little pain - more discomfort than pain. He is out of the sling and sleeping better.  He is gently keeping it moving through the day.  (On the computer a lot)    OBJECTIVE  ===============Per re-assessment on 6/5/25======================  POSTURE:  Pt in sling with Forward head , Rounded  and elevated shoulders    SKIN  Irrtiation in ta axilly     SHOULDER:           P ROM          (in degrees)                                                             R    5/14/25         R   6/5/25   Flexion                 38           130   Abduction     ~10          76    ER at neutral                   Lacks neutral             25   IR at neutral    To body            To body         ELBOW/WRIST   ROM:       IN DEGREES                        R       R 6/5/25   Flexion                 WFL       141   Extension               -40         -3   Supination            limited          70   Pronation            limited         90   Wrist Flexion                WFL        WFL   Wrist Extension                        WFL        WFL         PALPATION:   Tightness  and tenderness throughout the R UE including anterior shoulder,   biceps, and B UT.  mild spasm in distal biceps   ===============================================================    TREATMENT:  Modalities:  Interferential electric stimulation (IFC) performed to R shoulder    at 1-10 Hz with sweep for 15 minutes to strong sensation  with moist heat    Manual Therapy:   STM to R shoudler, and biceps with focus on deltoid and supraspinatus insertion  Right Scapular mobilization   STM to R UT and cervcial in sitting    Therapeutic Exercise:    PROM to the R shoulder and biceps  Pulleys 4 minutes  UBE  2 minutes forward and back  Supine   Tick tocks - DNP   Flexion with clasped hands   Triceps with  TB L1    1x20  Supine wand   **Chest press   **Flexion   **ER  ===================did not perform (DNP)=====================  Sitting  Bicep curl with 1#  1x20  Elbow extension in supine with arm supported  Supination/pronation with Elbow  in sitting 1#  1x20  Wrist flexion 1#  2x10  Wrist extension  1# 1x20    Supine  **Tick tocks   V, H, CW, CCW, alphabet  ========================================================================    Home exercise program: (HEP)  Supine wand   **Chest press   **Flexion   **ER  PROM for elbow extension  PROM for R shoulder flexion, abduction, and ER - gentle  Table glides  for flexion, scaption, ER x10  "each    Education/instruction:  Progression of exercise with cane AROM exercises      Prior:  Educated pt's wife in PROM for flexion and not to over stretch  Discussed nature of progress and not over-doing on painful times  Educated pt's wife in gentle massage  Discussed nerve entrapment  Recommended keeping a record of progress   current status, general goals, treatment plan   gentle motion   PROM   gentle manual to arm and neck   cold and safe use of each  Review of precautions  Table glides added to HEP  Progress  Shoulder anatomy  Don't push through pain  TENS use and parameters and electrode placement (chart given)  Progression  Self protraction with calsed hands in place of pendulums    PAIN  End of session pain rating: \"better\"      ASSESSMENT:  The pt is making excellent progress with less pain today and greater mobility since he is out of the sling.  He is very diligent with HEP throughout the day.  Soft tissue restriction at the supraspinatus and deltoid insertions.  He is highly motivated.      Compliance with HEP:  excellent      Assessment of current progress against goals:    progressing    Rehab potential:  good     GOALS:  Pt stated goal:  movement     Short term goals:  + Increase R shoudler ROM by 15  degrees all motions (met)  + Reduce pain by 2 points (Improving)  + Independent in a basic HEP  ( MET)     Long term goals:  + reduce pain to < 4/10  + Full AROM in R shoulders as needed for reaching over head  + 5/5 strength in R shoulders and scapular stabilizers for stability during functional reach and lifting  + Improved posture and independent postural correction for reduced pain and proper biomechanics for optimal shoulder function  + Independent in a HEP for self-management of symptoms.  + Reduce soft tissue restrictions for normal biomechanics and function  + Improve function with reduced reported disability by 50% on ASES  + Able to bathe and dress using the R UE without increased pain  + " Able to reach and lift as allowed by protocol without increased pain or compensation      PLAN  Skilled physical therapy 2 times per week for 12 weeks  Treatment may include:  Therapeutic Exercise (25161)  Therapeutic Activity (95874)  Manual therapy (72591)  Neuro-muscular re-education (57177)  Ultrasound (65371)  Unattended Electrical Stimulation (/27061)  Light therapy (46021)

## 2025-06-19 ENCOUNTER — TREATMENT (OUTPATIENT)
Dept: PHYSICAL THERAPY | Facility: CLINIC | Age: 70
End: 2025-06-19
Payer: MEDICARE

## 2025-06-19 DIAGNOSIS — M75.121 COMPLETE TEAR OF RIGHT ROTATOR CUFF, UNSPECIFIED WHETHER TRAUMATIC: ICD-10-CM

## 2025-06-19 PROCEDURE — 97140 MANUAL THERAPY 1/> REGIONS: CPT | Mod: GP | Performed by: PHYSICAL THERAPIST

## 2025-06-19 PROCEDURE — 97110 THERAPEUTIC EXERCISES: CPT | Mod: GP | Performed by: PHYSICAL THERAPIST

## 2025-06-19 PROCEDURE — 97014 ELECTRIC STIMULATION THERAPY: CPT | Mod: GP | Performed by: PHYSICAL THERAPIST
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
## 2025-06-24 ENCOUNTER — TREATMENT (OUTPATIENT)
Dept: PHYSICAL THERAPY | Facility: CLINIC | Age: 70
End: 2025-06-24
Payer: MEDICARE

## 2025-06-24 DIAGNOSIS — M75.121 COMPLETE TEAR OF RIGHT ROTATOR CUFF, UNSPECIFIED WHETHER TRAUMATIC: ICD-10-CM

## 2025-06-24 PROCEDURE — 97110 THERAPEUTIC EXERCISES: CPT | Mod: GP | Performed by: PHYSICAL THERAPIST

## 2025-06-24 PROCEDURE — 97014 ELECTRIC STIMULATION THERAPY: CPT | Mod: GP | Performed by: PHYSICAL THERAPIST

## 2025-06-24 PROCEDURE — 97140 MANUAL THERAPY 1/> REGIONS: CPT | Mod: GP | Performed by: PHYSICAL THERAPIST

## 2025-06-24 NOTE — PROGRESS NOTES
Physical Therapy Treatment     Patient Name: Kamari Zhao  MRN: [id number]  Today's Date: 6/24/2025  Time Calculation  Start Time: 0830  Stop Time: 0920  Time Calculation (min): 50 min    PT Modalities Time Entry  E-Stim (Unattended) Time Entry: 15  PT Therapeutic Procedures Time Entry  Therapeutic Exercise Time Entry: 10  Manual Therapy Time Entry: 20    INSURANCE:   5/21/2025     Visit Number: 12  VISITS APPROVED   24  Approval dates   05/14/2025 - 08/06/2025  Re-assessment on vist 7 on 6/5/25 2025 [facility] MEDICARE: EVAL ONLY/ $25 COPAY/ 100% [place]AGE/ [place]/ $3900 OOP (NM) PER [facility] SITE REF# [id number]  NOTE: 3 VISITS USED FOR 2025 TO DATE    Insurance Type: Payor: [employer] MEDICARE / Plan: [employer] MEDICARE / Product Type: *No Product type* /  CMS Re-Certification Period:5/14/25 to 8/12/25    Current Problem  1. Complete tear of right rotator cuff, unspecified whether traumatic  Follow Up In Physical Therapy          PRECAUTIONS:  Surgical:  6 more weeks of ROM prior to strengthening   (from 6/6/25)  Medical:   Cardiac  Fall risk:    low       PMH: (pertinent to PT evaluation)  ASHD, hypertension, CABG x 3, DM, anxiety, chronic right shoulder pain, complete rotator cuff tear, biceps tendinitis right shoulder, impingement right shoulder, labral tear right shoulder  *See Epic EMR for complete list.    Medical History Form: Reviewed (scanned into chart)    Surgery: 4/29/25    PAIN  Start of Treatment: 4/10    SUBJECTIVE  The pt opened a door at a store over the weekend that stuck and jerked his arm a bit. He has been stiff and sore all weekend.  He has heated, iced, and used his TENS , but it still feels stiff.    He needed pain meds yesterday, but that offset the time he could take his sleeping meds and is tired today    OBJECTIVE     PALPATION:   Tightness  and tenderness throughout the R UE including anterior shoulder,   biceps, and B UT.  mild spasm in distal biceps        TREATMENT:  Modalities:  Interferential electric stimulation (IFC) performed to R shoulder    at  Hz with sweep for 15 minutes to strong sensation  with cold pack    Manual Therapy:   STM to R shoudler, and biceps with focus on pecs insertion  Right Scapular mobilization   STM to R UT and cervcial in sitting - DNP    Therapeutic Exercise:    PROM to the R shoulder and biceps  Pulleys 3 minutes  UBE  2 minutes forward and back  Supine   Elbow extension in supine with arm supported  ========================DNP========================  Supine wand   *Chest press 1x10 hands apart   *Flexion 1x10   *ER 1x10  Standing wand  *Chest press 1x10  hands close   *Flexion to 90 degrees 1x10   *ER 1x10    Sitting  Bicep curl with 1#  1x20  Supination/pronation with Elbow  in sitting 1#  1x20  Wrist flexion 1#  2x10  Wrist extension  1# 1x20    Supine    Flexion with clasped hands  Triceps with  TB L1    1x20  ========================================================================    Home exercise program: (HEP)  Standing wand  *Chest press 1x10  hands close   *Flexion to 90 degrees 1x10   *ER 1x10  Supine wand   **Chest press   **Flexion   **ER  *Tick tocks   V, H, CW, CCW, alphabet  PROM for elbow extension  PROM for R shoulder flexion, abduction, and ER - gentle  Table glides  for flexion, scaption, ER x10 each    Education/instruction:  New  Cold vs heat  Recommendations for TENS use.  Exercise modification due to recent re-injury  Prior:  Educated pt's wife in PROM for flexion and not to over stretch  Discussed nature of progress and not over-doing on painful times  Educated pt's wife in gentle massage  Discussed nerve entrapment  Recommended keeping a record of progress   current status, general goals, treatment plan   gentle motion   PROM   gentle manual to arm and neck   cold and safe use of each  Review of precautions  Table glides added to HEP  Progress  Shoulder anatomy  Don't push through pain  TENS use  "and parameters and electrode placement (chart given)  Progression  Self protraction with calsed hands in place of pendulums  Progression of exercise with cane AROM exercises  Progression of exercise with cane AROM exercises  to standing    PAIN  End of session pain rating: \"better\"      ASSESSMENT:  The pt had a light re-injury with unexpected stress on the shoulder.  Therefore, most exercises were deferred in order to address tightness and pain.  He has good understanding of how to modify exercise/activity and purpose of heat vs cold.     He is very diligent with HEP throughout the day.   He is highly motivated.      Compliance with HEP:  excellent      Assessment of current progress against goals:    progressing    Rehab potential:  good     GOALS:  Pt stated goal:  movement     Short term goals:  + Increase R shoudler ROM by 15  degrees all motions (met)  + Reduce pain by 2 points (Improving)  + Independent in a basic HEP  ( MET)     Long term goals:  + reduce pain to < 4/10  + Full AROM in R shoulders as needed for reaching over head  + 5/5 strength in R shoulders and scapular stabilizers for stability during functional reach and lifting  + Improved posture and independent postural correction for reduced pain and proper biomechanics for optimal shoulder function  + Independent in a HEP for self-management of symptoms.  + Reduce soft tissue restrictions for normal biomechanics and function  + Improve function with reduced reported disability by 50% on ASES  + Able to bathe and dress using the R UE without increased pain  + Able to reach and lift as allowed by protocol without increased pain or compensation      PLAN  Skilled physical therapy 2 times per week for 12 weeks  Treatment may include:  Therapeutic Exercise (34850)  Therapeutic Activity (40397)  Manual therapy (35694)  Neuro-muscular re-education (49533)  Ultrasound (60825)  Unattended Electrical Stimulation (/19183)  Light therapy (76107)          "

## 2025-06-28 DIAGNOSIS — I10 BENIGN ESSENTIAL HTN: ICD-10-CM

## 2025-06-28 DIAGNOSIS — R73.01 IMPAIRED FASTING GLUCOSE: ICD-10-CM

## 2025-06-28 DIAGNOSIS — E78.00 HYPERCHOLESTEROLEMIA: ICD-10-CM

## 2025-06-29 DIAGNOSIS — F32.A ANXIETY AND DEPRESSION: ICD-10-CM

## 2025-06-29 DIAGNOSIS — F41.9 ANXIETY AND DEPRESSION: ICD-10-CM

## 2025-06-30 ENCOUNTER — TREATMENT (OUTPATIENT)
Dept: PHYSICAL THERAPY | Facility: CLINIC | Age: 70
End: 2025-06-30
Payer: MEDICARE

## 2025-06-30 DIAGNOSIS — M75.121 COMPLETE TEAR OF RIGHT ROTATOR CUFF, UNSPECIFIED WHETHER TRAUMATIC: ICD-10-CM

## 2025-06-30 PROCEDURE — 97140 MANUAL THERAPY 1/> REGIONS: CPT | Mod: GP | Performed by: PHYSICAL THERAPIST

## 2025-06-30 PROCEDURE — 97110 THERAPEUTIC EXERCISES: CPT | Mod: GP | Performed by: PHYSICAL THERAPIST

## 2025-06-30 PROCEDURE — 97014 ELECTRIC STIMULATION THERAPY: CPT | Mod: GP | Performed by: PHYSICAL THERAPIST

## 2025-06-30 RX ORDER — CLONAZEPAM 1 MG/1
1 TABLET ORAL 2 TIMES DAILY
Qty: 60 TABLET | Refills: 0 | Status: SHIPPED | OUTPATIENT
Start: 2025-06-30

## 2025-06-30 NOTE — PROGRESS NOTES
Physical Therapy Treatment     Patient Name: Kamari Zhao  MRN: [id number]  Today's Date: 6/30/2025  Time Calculation  Start Time: 0822  Stop Time: 0920  Time Calculation (min): 58 min    PT Modalities Time Entry  E-Stim (Unattended) Time Entry: 15  PT Therapeutic Procedures Time Entry  Therapeutic Exercise Time Entry: 22  Manual Therapy Time Entry: 15    INSURANCE:   5/21/2025     Visit Number: 12  VISITS APPROVED   24  Approval dates   05/14/2025 - 08/06/2025  Re-assessment on vist 7 on 6/5/25 2025 [facility] MEDICARE: EVAL ONLY/ $25 COPAY/ 100% C[place]AGE/ [place]/ $3900 OOP (NM) PER [facility] SITE REF# [id number]  NOTE: 3 VISITS USED FOR 2025 TO DATE    Insurance Type: Payor: [employer] MEDICARE / Plan: [employer] MEDICARE / Product Type: *No Product type* /  CMS Re-Certification Period:5/14/25 to 8/12/25    Current Problem  1. Complete tear of right rotator cuff, unspecified whether traumatic  Follow Up In Physical Therapy          PRECAUTIONS:  Surgical:  6 more weeks of ROM prior to strengthening   (from 6/6/25)  Medical:   Cardiac  Fall risk:    low       PMH: (pertinent to PT evaluation)  ASHD, hypertension, CABG x 3, DM, anxiety, chronic right shoulder pain, complete rotator cuff tear, biceps tendinitis right shoulder, impingement right shoulder, labral tear right shoulder  *See Epic EMR for complete list.    Medical History Form: Reviewed (scanned into chart)    Surgery: 4/29/25    PAIN  Start of Treatment: 4/10    SUBJECTIVE  The pt sees the doctor in 2 weeks. He is doing more functionally.  He is still sore the day after therapy.      OBJECTIVE     PALPATION:   Tightness  and tenderness throughout the R UE including anterior shoulder.    TREATMENT:  Modalities:  Interferential electric stimulation (IFC) performed to R shoulder    at  Hz with sweep for 15 minutes to strong sensation  with cold pack    Manual Therapy:   STM to R shoulder with focus on pecs insertion  PROM to the R shoulder  "  Right Scapular mobilization - DNP  STM to R UT and cervcial in sitting - DNP    Therapeutic Exercise:    Pulleys 3 minutes  UBE  3 minutes forward and back    Standing  Standing wand  *Chest press 1x10  hands close   *Flexion to 90 degrees 1x10   *ER 1x10   Horizontal abduction   Abduction  Towel behind back stretch for IR   Doorway stretch 3 levels for 30 seconds list    [place] TB - submaximal  IR and ER [id number]    ========================DNP========================  Supine wand   *Chest press 1x10 hands apart   *Flexion 1x10   *ER 1x10    Sitting  Bicep curl with 1#  1x20  Supination/pronation with Elbow  in sitting 1#  1x20  Wrist flexion 1#  2x10  Wrist extension  1# 1x20    Supine  Flexion with clasped hands  Triceps with  TB L1    1x20  ========================================================================    Home exercise program: (HEP)  Standing wand  *Chest press 1x10  hands close   *Flexion to 90 degrees 1x10   *ER 1x10  Supine wand   **Chest press   **Flexion   **ER  *Tick tocks   V, H, CW, CCW, alphabet  PROM for elbow extension  PROM for R shoulder flexion, abduction, and ER - gentle  Table glides  for flexion, scaption, ER x10 each  =====================================================  Education/instruction:  New  Added stretching \"**\" above  POC  Prior:  Educated pt's wife in PROM for flexion and not to over stretch  Discussed nature of progress and not over-doing on painful times  Educated pt's wife in gentle massage  Discussed nerve entrapment  Recommended keeping a record of progress   current status, general goals, treatment plan   gentle motion   PROM   gentle manual to arm and neck   cold and safe use of each  Review of precautions  Table glides added to HEP  Progress  Shoulder anatomy  Don't push through pain  TENS use and parameters and electrode placement (chart given)  Progression  Self protraction with calsed hands in place of pendulums  Progression of exercise with cane AROM " "exercises  Progression of exercise with cane AROM exercises  to standing  Cold vs heat  Recommendations for TENS use.  Exercise modification due to recent re-injury    PAIN  End of session pain rating: \"better\"      ASSESSMENT:  The pt is making good progress.  IR is limited and new stretching was issued.   He is very diligent with HEP throughout the day.   He is highly motivated.    He follows up with doctor in 2 weeks.    Compliance with HEP:  excellent      Assessment of current progress against goals:    progressing    Rehab potential:  good     GOALS:  Pt stated goal:  movement     Short term goals:  + Increase R shoudler ROM by 15  degrees all motions (met)  + Reduce pain by 2 points (Improving)  + Independent in a basic HEP  ( MET)     Long term goals:  + reduce pain to < 4/10  + Full AROM in R shoulders as needed for reaching over head  + 5/5 strength in R shoulders and scapular stabilizers for stability during functional reach and lifting  + Improved posture and independent postural correction for reduced pain and proper biomechanics for optimal shoulder function  + Independent in a HEP for self-management of symptoms.  + Reduce soft tissue restrictions for normal biomechanics and function  + Improve function with reduced reported disability by 50% on ASES  + Able to bathe and dress using the R UE without increased pain  + Able to reach and lift as allowed by protocol without increased pain or compensation      PLAN  Will decrease to 1x/week until follow up with doctor and able to progress to strengthening exercise,   Skilled physical therapy  for 12 weeks  Treatment may include:  Therapeutic Exercise (72839)  Therapeutic Activity (25978)  Manual therapy (73816)  Neuro-muscular re-education (05884)  Ultrasound (86788)  Unattended Electrical Stimulation (/58803)  Light therapy (04181)          "

## 2025-07-02 ENCOUNTER — APPOINTMENT (OUTPATIENT)
Dept: PHYSICAL THERAPY | Facility: CLINIC | Age: 70
End: 2025-07-02
Payer: MEDICARE

## 2025-07-06 DIAGNOSIS — G47.00 INSOMNIA, UNSPECIFIED: ICD-10-CM

## 2025-07-07 ENCOUNTER — APPOINTMENT (OUTPATIENT)
Dept: PHYSICAL THERAPY | Facility: CLINIC | Age: 70
End: 2025-07-07
Payer: MEDICARE

## 2025-07-07 DIAGNOSIS — M75.121 COMPLETE TEAR OF RIGHT ROTATOR CUFF, UNSPECIFIED WHETHER TRAUMATIC: ICD-10-CM

## 2025-07-07 PROCEDURE — 97140 MANUAL THERAPY 1/> REGIONS: CPT | Mod: GP | Performed by: PHYSICAL THERAPIST

## 2025-07-07 PROCEDURE — 97110 THERAPEUTIC EXERCISES: CPT | Mod: GP | Performed by: PHYSICAL THERAPIST

## 2025-07-07 RX ORDER — ZOLPIDEM TARTRATE 10 MG/1
10 TABLET ORAL NIGHTLY
Qty: 30 TABLET | Refills: 0 | Status: SHIPPED | OUTPATIENT
Start: 2025-07-07

## 2025-07-07 NOTE — PROGRESS NOTES
Physical Therapy Treatment  and re-assessment    Patient Name: Kamari Zhao  MRN: [id number]  Today's Date: 7/7/2025  Time Calculation  Start Time: 1000  Stop Time: 1045  Time Calculation (min): 45 min       PT Therapeutic Procedures Time Entry  Therapeutic Exercise Time Entry: 30  Manual Therapy Time Entry: 10    INSURANCE:   5/21/2025     Visit Number: 12  VISITS APPROVED   24  Approval dates   05/14/2025 - 08/06/2025  Re-assessment on vist 7 on 6/5/25 2025 [facility] MEDICARE: EVAL ONLY/ $25 COPAY/ 100% [place]AGE/ [place]IS[place]/ $3900 OOP ([place]) PER [facility] SITE REF# [id number]  NOTE: 3 VISITS USED FOR 2025 TO DATE    Insurance Type: Payor: [employer] MEDICARE / Plan: [employer] MEDICARE / Product Type: *No Product type* /  CMS Re-Certification Period:5/14/25 to 8/12/25    Current Problem  1. Complete tear of right rotator cuff, unspecified whether traumatic  Follow Up In Physical Therapy          PRECAUTIONS:  Surgical:  6 more weeks of ROM prior to strengthening   (from 6/6/25)  Medical:   Cardiac  Fall risk:    low       PMH: (pertinent to PT evaluation)  ASHD, hypertension, CABG x 3, DM, anxiety, chronic right shoulder pain, complete rotator cuff tear, biceps tendinitis right shoulder, impingement right shoulder, labral tear right shoulder  *See Epic EMR for complete list.    Medical History Form: Reviewed (scanned into chart)    Surgery: 4/29/25    PAIN  Start of Treatment: 1/10    Other Measures  Other Outcome Measures: ASES       13/30     SUBJECTIVE  The pt sees the doctor on Friday.  He is still sore but the deep lying pain is going away.  The pain no longer wakes him up and he is no longer using pain meds.  He is doing more functionally.  He is still sore the day after therapy.      He is able to get glasses off the top shelf with the R hand    OBJECTIVE   POSTURE:  Reduction in Forward head, Rounded  and elevated shoulders     SKIN  Irrtiation in ta axilly     SHOULDER:           P ROM         (in  degrees)                                                             R   5/14/25         R   6/5/25          R    7/7/25     in supine      R 7/7/25            standing with wand              Flexion                 38           130          140     150   Abduction     ~10          76          105       151   ER at neutral                   Lacks neutral             25        50      50   IR at neutral    To body            To body     To body    To L2         ELBOW/WRIST   ROM:       IN DEGREES                        R       R 6/5/25       R 7/7/25   Flexion                 WFL       141       145   Extension               -40         -3        0   Supination            limited          70        70   Pronation            limited         90       90         PALPATION:   Tightness  and tenderness throughout the R UE including anterior shoulder,   biceps, and B UT.  mild spasm in distal biceps         PALPATION:   Tightness  and tenderness throughout the R UE including anterior shoulder.    TREATMENT:  Modalities:  Deferred - not needed today    Manual Therapy:   STM to R shoulder with focus on pecs insertion  PROM to the R shoulder     Therapeutic Exercise:     See objective measures   Pulleys 3 minutes  UBE  3 minutes forward and back    Standing  Standing wand  *Chest press 1x20   *Flexion to 90 degrees 1x20   *ER 1x20   Horizontal abduction 1x20   Abduction 1x10  Towel behind back stretch for IR       [place]ach TB - submaximal  IR and ER [id number]    ==========================DNP================================  Sitting  Bicep curl with 1#  1x20  Supination/pronation with Elbow  in sitting 1#  1x20  Wrist flexion 1#  2x10  Wrist extension  1# 1x20    Supine  Flexion with clasped hands  Triceps with  TB L1    1x20    Doorway stretch 3 levels for 30 seconds list  ========================================================================    Home exercise program: (HEP)  Standing wand  *Chest press 1x10  hands  "close   *Flexion to 90 degrees 1x10   *ER 1x10  Supine wand   **Chest press   **Flexion   **ER  *Tick tocks   V, H, CW, CCW, alphabet  PROM for elbow extension  PROM for R shoulder flexion, abduction, and ER - gentle  Table glides  for flexion, scaption, ER x10 each  =====================================================  Education/instruction:  New  Progress  Progression of exercises  Prior:  Educated pt's wife in PROM for flexion and not to over stretch  Discussed nature of progress and not over-doing on painful times  Educated pt's wife in gentle massage  Discussed nerve entrapment  Recommended keeping a record of progress   current status, general goals, treatment plan   gentle motion   PROM   gentle manual to arm and neck   cold and safe use of each  Review of precautions  Table glides added to HEP  Progress  Shoulder anatomy  Don't push through pain  TENS use and parameters and electrode placement (chart given)  Progression  Self protraction with calsed hands in place of pendulums  Progression of exercise with cane AROM exercises  Progression of exercise with cane AROM exercises  to standing  Cold vs heat  Recommendations for TENS use.  Exercise modification due to recent re-injury  Added stretching \"**\" above  POC    PAIN  End of session pain rating: \"better\"      ASSESSMENT:  The pt is making good progress.  IR is limited but pt was cautioned to be very careful with stretching.  He will see the doctor at the end of the week and hopefully able to progress to strengthening.     He is very diligent with HEP throughout the day.   He is highly motivated.      Compliance with HEP:  excellent      Assessment of current progress against goals:    progressing    Rehab potential:  good     GOALS:  Pt stated goal:  movement     Short term goals:  + Increase R shoudler ROM by 15  degrees all motions (met)  + Reduce pain by 2 points (Improving)  + Independent in a basic HEP  ( MET)     Long term goals:  + reduce pain to < " 4/10  (Improving)  + Full AROM in R shoulders as needed for reaching over head(Improving)  + 5/5 strength in R shoulders and scapular stabilizers for stability during functional reach and lifting  + Improved posture and independent postural correction for reduced pain and proper biomechanics for optimal shoulder function(Improving)  + Independent in a HEP for self-management of symptoms.(Improving)  + Reduce soft tissue restrictions for normal biomechanics and function(Improving)  + Improve function with reduced reported disability by 50% on ASES  + Able to bathe and dress using the R UE without increased pain(Improving)  + Able to reach and lift as allowed by protocol without increased pain or compensation (Improving)     PLAN  Will decrease to 1x/week until follow up with doctor and able to progress to strengthening exercise,   Skilled physical therapy  for 12 weeks  Treatment may include:  Therapeutic Exercise (76616)  Therapeutic Activity (27607)  Manual therapy (26871)  Neuro-muscular re-education (37671)  Ultrasound (67085)  Unattended Electrical Stimulation (/70281)  Light therapy (57205)

## 2025-07-11 ENCOUNTER — APPOINTMENT (OUTPATIENT)
Dept: ORTHOPEDIC SURGERY | Facility: CLINIC | Age: 70
End: 2025-07-11
Payer: MEDICARE

## 2025-07-11 VITALS — HEIGHT: 77 IN | WEIGHT: 219 LBS | BODY MASS INDEX: 25.86 KG/M2

## 2025-07-11 DIAGNOSIS — Z98.890 STATUS POST RIGHT ROTATOR CUFF REPAIR: Primary | ICD-10-CM

## 2025-07-11 PROCEDURE — 1159F MED LIST DOCD IN RCRD: CPT

## 2025-07-11 PROCEDURE — 4010F ACE/ARB THERAPY RXD/TAKEN: CPT

## 2025-07-11 PROCEDURE — 1160F RVW MEDS BY RX/DR IN RCRD: CPT

## 2025-07-11 PROCEDURE — 1036F TOBACCO NON-USER: CPT

## 2025-07-11 PROCEDURE — 3008F BODY MASS INDEX DOCD: CPT

## 2025-07-11 PROCEDURE — 99024 POSTOP FOLLOW-UP VISIT: CPT

## 2025-07-11 PROCEDURE — 3048F LDL-C <100 MG/DL: CPT

## 2025-07-11 PROCEDURE — 3044F HG A1C LEVEL LT 7.0%: CPT

## 2025-07-11 PROCEDURE — 3062F POS MACROALBUMINURIA REV: CPT

## 2025-07-11 ASSESSMENT — PAIN - FUNCTIONAL ASSESSMENT: PAIN_FUNCTIONAL_ASSESSMENT: NO/DENIES PAIN

## 2025-07-11 NOTE — PROGRESS NOTES
History of Present Illness  No chief complaint on file.      69 y.o. male is 10 weeks status post right shoulder scope with rotator cuff repair and biceps tenodesis. Patient states that he still has some pain.  Patient reports that he is doing well in physical therapy.  He states that he is making quite a bit of progress with his range of motion.  He still has some discomfort mostly with internal rotation but states that it is tolerable.  He continues to take Tylenol and Flexeril as needed for pain.  He denies any radiating symptoms today. He denies any chest pain or shortness of breath. They state pain is well controlled and continuing to improve. State they are continuing to progress well. No concern with his incisions.    Review of Systems   GENERAL: Negative for malaise, significant weight loss, fever, chills  MUSCULOSKELETAL: see HPI  NEURO:  Negative    Exam  right shoulder  examination of the right shoulder demonstrates well-healed portal site incisions.  There is no erythema or evidence of infection today.  He has no tenderness palpation of the sternoclavicular joint or AC joint today.  He is nontender along the bicipital groove.  Range of motion of the right shoulder is 120 degrees forward flexion abduction, externally rotates to 60 degrees and internally rotates to L3.  SILT. UE is NVI.    Assessment  Patient is 10 weeks status post right shoulder scope with rotator cuff repair and biceps tenodesis.    Plan  Discussed management with patient today.  At this time discussed with him that he is doing quite well.  His motion is progressing well.  He should continue to do his home exercise program..  Discussed with him in 2 weeks he can incorporate strengthening exercises.  He can slowly increase his activities as tolerable.  Continue to take Tylenol and Flexeril as needed for pain.  I will plan on following up with the patient in 6 to 8 weeks for recheck of his right shoulder.  If he continues to progress  satisfactorily then we will plan on releasing him regarding this issue.  If he has any questions or concerns he can call the office to make a follow-up sooner if needed.

## 2025-07-14 ENCOUNTER — TREATMENT (OUTPATIENT)
Dept: PHYSICAL THERAPY | Facility: CLINIC | Age: 70
End: 2025-07-14
Payer: MEDICARE

## 2025-07-14 DIAGNOSIS — M75.121 COMPLETE TEAR OF RIGHT ROTATOR CUFF, UNSPECIFIED WHETHER TRAUMATIC: ICD-10-CM

## 2025-07-14 PROCEDURE — 97110 THERAPEUTIC EXERCISES: CPT | Mod: GP,CQ

## 2025-07-14 PROCEDURE — 97140 MANUAL THERAPY 1/> REGIONS: CPT | Mod: GP,CQ

## 2025-07-14 NOTE — PROGRESS NOTES
Physical Therapy Treatment  and re-assessment    Patient Name: Kamari Zhao  MRN: [id number]  Today's Date: 7/14/2025  Time Calculation  Start Time: 0750  Stop Time: 0830  Time Calculation (min): 40 min       PT Therapeutic Procedures Time Entry  Therapeutic Exercise Time Entry: 30  Manual Therapy Time Entry: 10    INSURANCE:   5/21/2025     Visit Number: 12  VISITS APPROVED   24  Approval dates   05/14/2025 - 08/06/2025  Re-assessment on vist 7 on 6/5/25 2025 [facility] MEDICARE: EVAL ONLY/ $25 COPAY/ 100% [place]AGE/ [place]/ $3900 OOP (NM) PER [facility] SITE REF# [id number]  NOTE: 3 VISITS USED FOR 2025 TO DATE    Insurance Type: Payor: [employer] MEDICARE / Plan: [employer] MEDICARE / Product Type: *No Product type* /  CMS Re-Certification Period:5/14/25 to 8/12/25    Current Problem  1. Complete tear of right rotator cuff, unspecified whether traumatic  Follow Up In Physical Therapy          PRECAUTIONS:  Surgical:  6 more weeks of ROM prior to strengthening   (from 6/6/25)  Medical:   Cardiac  Fall risk:    low       PMH: (pertinent to PT evaluation)  ASHD, hypertension, CABG x 3, DM, anxiety, chronic right shoulder pain, complete rotator cuff tear, biceps tendinitis right shoulder, impingement right shoulder, labral tear right shoulder  *See Epic EMR for complete list.    Medical History Form: Reviewed (scanned into chart)    Surgery: 4/29/25    PAIN  Start of Treatment: 1/10          SUBJECTIVE: Pt reported MD pleased with progress however no strengthening as of yet ---2 more weeks        The pt sees the doctor on Friday.  He is still sore but the deep lying pain is going away.  The pain no longer wakes him up and he is no longer using pain meds.  He is doing more functionally.  He is still sore the day after therapy.      He is able to get glasses off the top shelf with the R hand    OBJECTIVE   POSTURE:  Reduction in Forward head, Rounded  and elevated shoulders     SKIN  Irrtiation in ta axilly      SHOULDER:           P ROM         (in degrees)                                                             R   5/14/25         R   6/5/25          R    7/7/25     in supine      R 7/7/25            standing with wand              Flexion                 38           130          140     150   Abduction     ~10          76          105       151   ER at neutral                   Lacks neutral             25        50      50   IR at neutral    To body            To body     To body    To L2         ELBOW/WRIST   ROM:       IN DEGREES                        R       R 6/5/25       R 7/7/25   Flexion                 WFL       141       145   Extension               -40         -3        0   Supination            limited          70        70   Pronation            limited         90       90         PALPATION:   Tightness  and tenderness throughout the R UE including anterior shoulder,   biceps, and B UT.  mild spasm in distal biceps         PALPATION:   Tightness  and tenderness throughout the R UE including anterior shoulder.    TREATMENT:  Modalities:  Deferred - not needed today    Manual Therapy:   STM to R shoulder with focus on pecs insertion  PROM to the R shoulder     Therapeutic Exercise:     See objective measures   Pulleys 3 minutes  UBE  3 minutes forward and back    Standing: wall ball rolls CW/CCW  Standing wand  *Chest press 1x20   *Flexion to 90 degrees 1x20   *ER 1x20   Horizontal abduction 1x20   Abduction 1x10  Towel behind back stretch for IR --- slight discomfort noted with this movement    Supine: with wand  Flexion   Serratus punch  Added golf club stretch with grey wedge for support ER  Doorway stretch 3 levels for 30 seconds list  ========================================================================    Home exercise program: (HEP)  Standing wand  *Chest press 1x10  hands close   *Flexion to 90 degrees 1x10   *ER 1x10  Supine wand   **Chest press   **Flexion   **ER  *Tick tocks   V, H, CW,  "CCW, alphabet  PROM for elbow extension  PROM for R shoulder flexion, abduction, and ER - gentle  Table glides  for flexion, scaption, ER x10 each  =====================================================  Education/instruction:  New  Progress  Progression of exercises  Prior:  Educated pt's wife in PROM for flexion and not to over stretch  Discussed nature of progress and not over-doing on painful times  Educated pt's wife in gentle massage  Discussed nerve entrapment  Recommended keeping a record of progress   current status, general goals, treatment plan   gentle motion   PROM   gentle manual to arm and neck   cold and safe use of each  Review of precautions  Table glides added to HEP  Progress  Shoulder anatomy  Don't push through pain  TENS use and parameters and electrode placement (chart given)  Progression  Self protraction with calsed hands in place of pendulums  Progression of exercise with cane AROM exercises  Progression of exercise with cane AROM exercises  to standing  Cold vs heat  Recommendations for TENS use.  Exercise modification due to recent re-injury  Added stretching \"**\" above  POC    PAIN  End of session pain rating: \"better\"      ASSESSMENT: Rusty stretching well , tightness remains \"front of R shld\" per pt. Cont with HEP, progressing well        The pt is making good progress.  IR is limited but pt was cautioned to be very careful with stretching.  He will see the doctor at the end of the week and hopefully able to progress to strengthening.     He is very diligent with HEP throughout the day.   He is highly motivated.      Compliance with HEP:  excellent      Assessment of current progress against goals:    progressing    Rehab potential:  good     GOALS:  Pt stated goal:  movement     Short term goals:  + Increase R shoudler ROM by 15  degrees all motions (met)  + Reduce pain by 2 points (Improving)  + Independent in a basic HEP  ( MET)     Long term goals:  + reduce pain to < 4/10  " (Improving)  + Full AROM in R shoulders as needed for reaching over head(Improving)  + 5/5 strength in R shoulders and scapular stabilizers for stability during functional reach and lifting  + Improved posture and independent postural correction for reduced pain and proper biomechanics for optimal shoulder function(Improving)  + Independent in a HEP for self-management of symptoms.(Improving)  + Reduce soft tissue restrictions for normal biomechanics and function(Improving)  + Improve function with reduced reported disability by 50% on ASES  + Able to bathe and dress using the R UE without increased pain(Improving)  + Able to reach and lift as allowed by protocol without increased pain or compensation (Improving)     PLAN  Will decrease to 1x/week until follow up with doctor and able to progress to strengthening exercise,   Skilled physical therapy  for 12 weeks  Treatment may include:  Therapeutic Exercise (64021)  Therapeutic Activity (29530)  Manual therapy (72330)  Neuro-muscular re-education (25718)  Ultrasound (42764)  Unattended Electrical Stimulation (/10034)  Light therapy (80271)

## 2025-07-15 LAB
ALBUMIN SERPL-MCNC: 4.3 G/DL (ref 3.6–5.1)
ALBUMIN/CREAT UR: 16 MG/G CREAT
ALP SERPL-CCNC: 67 U/L (ref 35–144)
ALT SERPL-CCNC: 14 U/L (ref 9–46)
ANION GAP SERPL CALCULATED.4IONS-SCNC: 7 MMOL/L (CALC) (ref 7–17)
APPEARANCE UR: CLEAR
AST SERPL-CCNC: 16 U/L (ref 10–35)
BILIRUB SERPL-MCNC: 0.4 MG/DL (ref 0.2–1.2)
BILIRUB UR QL STRIP: NEGATIVE
BUN SERPL-MCNC: 12 MG/DL (ref 7–25)
CALCIUM SERPL-MCNC: 9.4 MG/DL (ref 8.6–10.3)
CHLORIDE SERPL-SCNC: 99 MMOL/L (ref 98–110)
CHOLEST SERPL-MCNC: 116 MG/DL
CHOLEST/HDLC SERPL: 3.6 (CALC)
CO2 SERPL-SCNC: 31 MMOL/L (ref 20–32)
COLOR UR: YELLOW
CREAT SERPL-MCNC: 0.97 MG/DL (ref 0.7–1.35)
CREAT UR-MCNC: 80 MG/DL (ref 20–320)
EGFRCR SERPLBLD CKD-EPI 2021: 86 ML/MIN/1.73M2
EST. AVERAGE GLUCOSE BLD GHB EST-MCNC: 114 MG/DL
EST. AVERAGE GLUCOSE BLD GHB EST-SCNC: 6.3 MMOL/L
GLUCOSE SERPL-MCNC: 106 MG/DL (ref 65–99)
GLUCOSE UR QL STRIP: NEGATIVE
HBA1C MFR BLD: 5.6 %
HDLC SERPL-MCNC: 32 MG/DL
HGB UR QL STRIP: NEGATIVE
KETONES UR QL STRIP: NEGATIVE
LDLC SERPL CALC-MCNC: 69 MG/DL (CALC)
LEUKOCYTE ESTERASE UR QL STRIP: NEGATIVE
MICROALBUMIN UR-MCNC: 1.3 MG/DL
NITRITE UR QL STRIP: NEGATIVE
NONHDLC SERPL-MCNC: 84 MG/DL (CALC)
PH UR STRIP: 7 [PH] (ref 5–8)
POTASSIUM SERPL-SCNC: 4.4 MMOL/L (ref 3.5–5.3)
PROT SERPL-MCNC: 8.1 G/DL (ref 6.1–8.1)
PROT UR QL STRIP: NEGATIVE
SODIUM SERPL-SCNC: 137 MMOL/L (ref 135–146)
SP GR UR STRIP: 1.01 (ref 1–1.03)
TRIGL SERPL-MCNC: 72 MG/DL

## 2025-07-18 ENCOUNTER — APPOINTMENT (OUTPATIENT)
Dept: ORTHOPEDIC SURGERY | Facility: CLINIC | Age: 70
End: 2025-07-18
Payer: MEDICARE

## 2025-07-22 ENCOUNTER — APPOINTMENT (OUTPATIENT)
Dept: PHYSICAL THERAPY | Facility: CLINIC | Age: 70
End: 2025-07-22
Payer: MEDICARE

## 2025-07-28 ENCOUNTER — TELEPHONE (OUTPATIENT)
Dept: PRIMARY CARE | Facility: CLINIC | Age: 70
End: 2025-07-28

## 2025-07-28 ENCOUNTER — APPOINTMENT (OUTPATIENT)
Dept: PRIMARY CARE | Facility: CLINIC | Age: 70
End: 2025-07-28
Payer: MEDICARE

## 2025-07-28 ENCOUNTER — TREATMENT (OUTPATIENT)
Dept: PHYSICAL THERAPY | Facility: CLINIC | Age: 70
End: 2025-07-28
Payer: MEDICARE

## 2025-07-28 VITALS
WEIGHT: 223 LBS | DIASTOLIC BLOOD PRESSURE: 76 MMHG | BODY MASS INDEX: 27.16 KG/M2 | SYSTOLIC BLOOD PRESSURE: 140 MMHG | TEMPERATURE: 97.3 F | HEART RATE: 51 BPM | HEIGHT: 76 IN | OXYGEN SATURATION: 98 %

## 2025-07-28 DIAGNOSIS — I10 BENIGN ESSENTIAL HTN: ICD-10-CM

## 2025-07-28 DIAGNOSIS — E78.00 HYPERCHOLESTEROLEMIA: ICD-10-CM

## 2025-07-28 DIAGNOSIS — F32.A ANXIETY AND DEPRESSION: ICD-10-CM

## 2025-07-28 DIAGNOSIS — F51.01 PRIMARY INSOMNIA: ICD-10-CM

## 2025-07-28 DIAGNOSIS — R73.01 IMPAIRED FASTING GLUCOSE: ICD-10-CM

## 2025-07-28 DIAGNOSIS — M75.121 COMPLETE TEAR OF RIGHT ROTATOR CUFF, UNSPECIFIED WHETHER TRAUMATIC: ICD-10-CM

## 2025-07-28 DIAGNOSIS — Z12.5 SCREENING FOR PROSTATE CANCER: ICD-10-CM

## 2025-07-28 DIAGNOSIS — I25.10 ATHEROSCLEROSIS OF NATIVE CORONARY ARTERY OF NATIVE HEART WITHOUT ANGINA PECTORIS: Primary | ICD-10-CM

## 2025-07-28 DIAGNOSIS — F41.9 ANXIETY AND DEPRESSION: ICD-10-CM

## 2025-07-28 PROBLEM — G47.00 INSOMNIA: Status: ACTIVE | Noted: 2020-03-20

## 2025-07-28 PROCEDURE — 3008F BODY MASS INDEX DOCD: CPT | Performed by: FAMILY MEDICINE

## 2025-07-28 PROCEDURE — 1159F MED LIST DOCD IN RCRD: CPT | Performed by: FAMILY MEDICINE

## 2025-07-28 PROCEDURE — 3077F SYST BP >= 140 MM HG: CPT | Performed by: FAMILY MEDICINE

## 2025-07-28 PROCEDURE — 99214 OFFICE O/P EST MOD 30 MIN: CPT | Performed by: FAMILY MEDICINE

## 2025-07-28 PROCEDURE — 4010F ACE/ARB THERAPY RXD/TAKEN: CPT | Performed by: FAMILY MEDICINE

## 2025-07-28 PROCEDURE — 3078F DIAST BP <80 MM HG: CPT | Performed by: FAMILY MEDICINE

## 2025-07-28 PROCEDURE — 97140 MANUAL THERAPY 1/> REGIONS: CPT | Mod: GP | Performed by: PHYSICAL THERAPIST

## 2025-07-28 PROCEDURE — 97110 THERAPEUTIC EXERCISES: CPT | Mod: GP | Performed by: PHYSICAL THERAPIST

## 2025-07-28 PROCEDURE — 1126F AMNT PAIN NOTED NONE PRSNT: CPT | Performed by: FAMILY MEDICINE

## 2025-07-28 ASSESSMENT — PAIN SCALES - GENERAL: PAINLEVEL_OUTOF10: 0-NO PAIN

## 2025-07-28 NOTE — ASSESSMENT & PLAN NOTE
He has been unable to cut back on these medications for sleeping.  Continue Klonopin and Ambien at night.  Recheck in 6 months.

## 2025-07-28 NOTE — ASSESSMENT & PLAN NOTE
Continue Toprol, hydrochlorothiazide and Aceon.  Continue following with his cardiologist.  Recheck with me in 6 months at [facility].

## 2025-07-28 NOTE — ASSESSMENT & PLAN NOTE
Continue current medication and following with his cardiologist.  Recheck with me in 6 months at [facility].

## 2025-07-28 NOTE — PROGRESS NOTES
"Subjective   Patient ID: Roshan Zhao is a 69 y.o. male who presents for Hypertension, Hyperlipidemia, and Diabetes (/Labs and A1C done 7/14/2025/Urine albumin done 7/14/2025/).    HPI    ROSHAN is here also for follow up of benign essential hypertension.  He is on Toprol 100 mg, BID, HCTZ 50 mg. and Aceon 4 mg (ACE inhibitor).  He is followed by Dr. Cordero.     2. ROSHAN is seen today also for follow up of High cholesterol.  He is on lovastatin. Recent  LDL is 69.     3. ROSHAN is seen today for follow-up of coronary artery disease.  He is S/P CABG in 2001 and is followed by Dr. Cordero.     4. He is also seen in follow up for insomnia. He is taking two tablets of Klonopin 1 mg and one 10 mg Ambien tablet at night.     5. ROSHAN is seen for also for follow up for GERD.  He takes PRN OTC medication once in a while.      6.   He is also here for elevated glucose.  No history of diabetes.  Recent A1c is 5.6.  He is on no medication.    Review of Systems  Denies headache, blurred vision, chest pain, shortness of breath, nausea or vomiting, change in bowel habits or leg pain or swelling.    Objective   /76 (BP Location: Left arm, Patient Position: Sitting)   Pulse 51   Temp 36.3 °C (97.3 °F) (Temporal)   Ht 1.93 m (6' 4\")   Wt 101 kg (223 lb)   SpO2 98%   BMI 27.14 kg/m²     Physical Exam  Vitals and nurse's notes reviewed  General: no acute distress  HEENT: Normal  Neck: Supple  Lungs: Clear  Cardio: RRR w/o murmur  Abdomen: Soft, nontender, no hepatosplenomegaly  Extremities: No edema, no calf tenderness  Neuro: Alert and oriented with no focal deficits    Assessment/Plan   Assessment & Plan  Atherosclerosis of native coronary artery of native heart without angina pectoris  Continue current medication and following with his cardiologist.  Recheck with me in 6 months at [facility].         Benign essential HTN  Continue Toprol, hydrochlorothiazide and Aceon.  Continue following with his cardiologist.  " Recheck with me in 6 months at [facility].         Hypercholesterolemia  Continue lovastatin.  Continue improved diet.  Recheck in 6 months at [facility].         Impaired fasting glucose  Keep off medication.  Continue cut back on carbs in diet.  Recheck in 6 months.         Primary insomnia  He has been unable to cut back on these medications for sleeping.  Continue Klonopin and Ambien at night.  Recheck in 6 months.

## 2025-07-28 NOTE — PROGRESS NOTES
Physical Therapy Treatment     Patient Name: Kamari Zhao  MRN: [id number]  Today's Date: 7/28/2025  Time Calculation  Start Time: 1045  Stop Time: 1130  Time Calculation (min): 45 min       PT Therapeutic Procedures Time Entry  Therapeutic Exercise Time Entry: 25  Manual Therapy Time Entry: 15    INSURANCE:   5/21/2025     Visit Number: 16  VISITS APPROVED   24  Approval dates   05/14/2025 - 08/06/2025  Re-assessment on vist 7 on 6/5/25 and on visit 14 on 7/7/25 2025 [facility] MEDICARE: EVAL ONLY/ $25 COPAY/ 100% C[place]AGE/ [place]/ $3900 OOP (NM) PER [facility] SITE REF# [id number]  NOTE: 3 VISITS USED FOR 2025 TO DATE    Insurance Type: Payor: [employer] MEDICARE / Plan: [employer] MEDICARE / Product Type: *No Product type* /  CMS Re-Certification Period:5/14/25 to 8/12/25    Current Problem  1. Complete tear of right rotator cuff, unspecified whether traumatic  Follow Up In Physical Therapy          PRECAUTIONS:  Surgical:  no restrictions   Medical:   Cardiac  Fall risk:    low       PMH: (pertinent to PT evaluation)  ASHD, hypertension, CABG x 3, DM, anxiety, chronic right shoulder pain, complete rotator cuff tear, biceps tendinitis right shoulder, impingement right shoulder, labral tear right shoulder  *See Epic EMR for complete list.    Medical History Form: Reviewed (scanned into chart)    Surgery: 4/29/25    PAIN  Start of Treatment: 2/10          SUBJECTIVE: The pt saw the doctor who was pleased with progress. He still has some tightness.  He is able to start resistance training.      Pain has been about a 2/10 for about a week.  He jerked a door that was stuck.        OBJECTIVE   POSTURE:  Reduction in Forward head, Rounded  and elevated shoulders     SKIN  Irrtiation in ta axilly     SHOULDER:           P ROM         (in degrees)                                                             R   5/14/25         R   6/5/25          R    7/7/25     in supine      R 7/7/25            standing with wand               Flexion                 38           130          140     150   Abduction     ~10          76          105       151   ER at neutral                   Lacks neutral             25        50      50   IR at neutral    To body            To body     To body    To L2         ELBOW/WRIST   ROM:       IN DEGREES                        R       R 6/5/25       R 7/7/25   Flexion                 WFL       141       145   Extension               -40         -3        0   Supination            limited          70        70   Pronation            limited         90       90         PALPATION: - today   Tightness  and tenderness throughout the R UE including anterior shoulder,   biceps, and B UT.  mild spasm in distal biceps      TREATMENT:  Manual Therapy:   STM to R shoulder with focus on pecs insertion, biceps  PROM to the R shoulder  with gentle oscillations    Therapeutic Exercise:   Cable column  + Rows  2 x10  with 3 #    + Long Arm Extension 2 x10  with 3 #    + Lat Pull down  2 x10  with 3 #    + Shoulder IR with towel  1 x10  with 3 #    + Shoulder ER with towel  1 x10  with 3 #     + triceps   1x10 with 3#    Bicep curl 5# 1x10        Pulleys 3 minutes  UBE  2 minutes forward and back     ========================================================================    Home exercise program: (HEP)  Standing wand  *Chest press 1x10  hands close   *Flexion to 90 degrees 1x10   *ER 1x10  Supine wand   **Chest press   **Flexion   **ER  *Tick tocks   V, H, CW, CCW, alphabet  PROM for elbow extension  PROM for R shoulder flexion, abduction, and ER - gentle  Table glides  for flexion, scaption, ER x10 each  =====================================================  Education/instruction:  New  Principles of strengthening with goal of 3x/week   Need to progress slowly.    Prior:  Educated pt's wife in PROM for flexion and not to over stretch  Discussed nature of progress and not over-doing on painful times  Educated pt's  "wife in gentle massage  Discussed nerve entrapment  Recommended keeping a record of progress   current status, general goals, treatment plan   gentle motion   PROM   gentle manual to arm and neck   cold and safe use of each  Review of precautions  Table glides added to HEP  Progress  Shoulder anatomy  Don't push through pain  TENS use and parameters and electrode placement (chart given)  Progression  Self protraction with calsed hands in place of pendulums  Progression of exercise with cane AROM exercises  Progression of exercise with cane AROM exercises  to standing  Cold vs heat  Recommendations for TENS use.  Exercise modification due to recent re-injury  Added stretching \"**\" above  POC  Progress  Progression of exercises    PAIN  End of session pain rating: \"better\"      ASSESSMENT: The patient returns to physical therapy now that he has been cleared for strengthening of the right shoulder.  He was able to progress to light resistance with all shoulder exercises.  First scapular stabilizing exercises able to perform 20 repetitions, but with fatigue as treatment progressed exercises were decreased to 10 repetitions.  He did have muscle soreness following some exercises, therefore strengthening exercises were broken up with pulleys and UB.  At the end of treatment patient did have muscle guarding and soreness that reduced nicely with manual therapy techniques.    This patient is appropriate for continued therapy, until the time he can perform strengthening exercises without skilled guidance.    Compliance with HEP:  excellent      Assessment of current progress against goals:    progressing    Rehab potential:  good     GOALS:  Pt stated goal:  movement     Short term goals:  + Increase R shoudler ROM by 15  degrees all motions (met)  + Reduce pain by 2 points (Improving)  + Independent in a basic HEP  ( MET)     Long term goals:  + reduce pain to < 4/10  (Improving)  + Full AROM in R shoulders as needed for " reaching over head(Improving)  + 5/5 strength in R shoulders and scapular stabilizers for stability during functional reach and lifting  + Improved posture and independent postural correction for reduced pain and proper biomechanics for optimal shoulder function(Improving)  + Independent in a HEP for self-management of symptoms.(Improving)  + Reduce soft tissue restrictions for normal biomechanics and function(Improving)  + Improve function with reduced reported disability by 50% on ASES  + Able to bathe and dress using the R UE without increased pain(Improving)  + Able to reach and lift as allowed by protocol without increased pain or compensation (Improving)     PLAN  Will decrease to 1x/week until follow up with doctor and able to progress to strengthening exercise,   Skilled physical therapy  for 12 weeks  Treatment may include:  Therapeutic Exercise (29571)  Therapeutic Activity (45375)  Manual therapy (35677)  Neuro-muscular re-education (74840)  Ultrasound (04829)  Unattended Electrical Stimulation (/63619)  Light therapy (55270)

## 2025-07-29 RX ORDER — CLONAZEPAM 1 MG/1
1 TABLET ORAL 2 TIMES DAILY
Qty: 60 TABLET | Refills: 0 | Status: SHIPPED | OUTPATIENT
Start: 2025-07-29

## 2025-08-04 ENCOUNTER — APPOINTMENT (OUTPATIENT)
Dept: PHYSICAL THERAPY | Facility: CLINIC | Age: 70
End: 2025-08-04
Payer: MEDICARE

## 2025-08-04 DIAGNOSIS — M75.121 COMPLETE TEAR OF RIGHT ROTATOR CUFF, UNSPECIFIED WHETHER TRAUMATIC: ICD-10-CM

## 2025-08-04 PROCEDURE — 97014 ELECTRIC STIMULATION THERAPY: CPT | Mod: GP | Performed by: PHYSICAL THERAPIST

## 2025-08-04 PROCEDURE — 97140 MANUAL THERAPY 1/> REGIONS: CPT | Mod: GP | Performed by: PHYSICAL THERAPIST

## 2025-08-04 PROCEDURE — 97110 THERAPEUTIC EXERCISES: CPT | Mod: GP | Performed by: PHYSICAL THERAPIST

## 2025-08-04 NOTE — PROGRESS NOTES
"Physical Therapy Treatment     Patient Name: Kamari Zhao  MRN: [id number]  Today's Date: 8/4/2025  Time Calculation  Start Time: 0830  Stop Time: 0920  Time Calculation (min): 50 min    PT Modalities Time Entry  E-Stim (Unattended) Time Entry: 15  PT Therapeutic Procedures Time Entry  Therapeutic Exercise Time Entry: 20  Manual Therapy Time Entry: 15    INSURANCE:   5/21/2025     Visit Number: 17  VISITS APPROVED   24  Approval dates   05/14/2025 - 08/06/2025  Re-assessment on vist 7 on 6/5/25 and on visit 14 on 7/7/25 2025 [facility] MEDICARE: EVAL ONLY/ $25 COPAY/ 100% C[place]AGE/ [place]/ $3900 OOP (NM) PER [facility] SITE REF# [id number]  NOTE: 3 VISITS USED FOR 2025 TO DATE    Insurance Type: Payor: [employer] MEDICARE / Plan: [employer] MEDICARE / Product Type: *No Product type* /  CMS Re-Certification Period:5/14/25 to 8/12/25    Current Problem  1. Complete tear of right rotator cuff, unspecified whether traumatic  Follow Up In Physical Therapy          PRECAUTIONS:  Surgical:  no restrictions   Medical:   Cardiac  Fall risk:    low       PMH: (pertinent to PT evaluation)  ASHD, hypertension, CABG x 3, DM, anxiety, chronic right shoulder pain, complete rotator cuff tear, biceps tendinitis right shoulder, impingement right shoulder, labral tear right shoulder  *See Epic EMR for complete list.    Medical History Form: Reviewed (scanned into chart)    Surgery: 4/29/25    PAIN  Start of Treatment: 3-4/10          SUBJECTIVE:   The pt  has been sore for about 2 weeks.  He  gets a pop in the front of the shoulder when he brings the arm across in front of him.  \"It feels tight\"     He is doing his stretching every day and strengthening 3 times at home.           OBJECTIVE   POSTURE:  Reduction in Forward head, Rounded  and elevated shoulders     SKIN  Irrtiation in ta axilly     SHOULDER:           P ROM         (in degrees)                                                             R   5/14/25         R   " 6/5/25          R    7/7/25     in supine      R 7/7/25            standing with wand              Flexion                 38           130          140     150   Abduction     ~10          76          105       151   ER at neutral                   Lacks neutral             25        50      50   IR at neutral    To body            To body     To body    To L2         ELBOW/WRIST   ROM:       IN DEGREES                        R       R 6/5/25       R 7/7/25   Flexion                 WFL       141       145   Extension               -40         -3        0   Supination            limited          70        70   Pronation            limited         90       90         PALPATION:    Tightness  and tenderness throughout the R UE including anterior shoulder,   biceps, and B UT.  mild spasm in distal biceps      TREATMENT:  Manual Therapy:   STM to R shoulder with focus on pecs insertion, biceps  PROM to the R shoulder  with gentle oscillations    Therapeutic Exercise:   Warm up  Pulleys 3 minutes  UBE  2 minutes forward and back     Lime green TB  +ER  x10  +Biceps curl  x10  + triceps   1x10     3# wand   Ceiling press  x15  Flexion to 90 degrees  x10    Cable column  + Rows  1 x10  with 3 #    + Long Arm Extension  x1  stopped due to pain  + Lat Pull down  1 x10  with 3 #      Modalities:  Interferential electric stimulation (IFC) / pre-modulated electric stimulation performed to R shoulder   at 1-10 Hz with sweep for 15 minutes to strong sensation          ========================================================================  Home exercise program: (HEP)  Standing wand  *Chest press 1x10  hands close   *Flexion to 90 degrees 1x10   *ER 1x10  Supine wand   **Chest press   **Flexion   **ER  *Tick tocks   V, H, CW, CCW, alphabet  PROM for elbow extension  PROM for R shoulder flexion, abduction, and ER - gentle  Table glides  for flexion, scaption, ER x10  "each  =====================================================  Education/instruction:  New  Focus on good form/technique not weights or large motion   Prior:  Educated pt's wife in PROM for flexion and not to over stretch  Discussed nature of progress and not over-doing on painful times  Educated pt's wife in gentle massage  Discussed nerve entrapment  Recommended keeping a record of progress   current status, general goals, treatment plan   gentle motion   PROM   gentle manual to arm and neck   cold and safe use of each  Review of precautions  Table glides added to HEP  Progress  Shoulder anatomy  Don't push through pain  TENS use and parameters and electrode placement (chart given)  Progression  Self protraction with calsed hands in place of pendulums  Progression of exercise with cane AROM exercises  Progression of exercise with cane AROM exercises  to standing  Cold vs heat  Recommendations for TENS use.  Exercise modification due to recent re-injury  Added stretching \"**\" above  POC  Progress  Progression of exercises  Principles of strengthening with goal of 3x/week   Need to progress slowly.     PAIN  End of session pain ratin/10      ASSESSMENT: The patient started therapy with significant tightness and guarding in the anterior R shoulder/pecs.  This reduced with manual , after which he was able to perform light strengthening exercises without pain.      This patient is appropriate for continued therapy, until the time he can perform strengthening exercises without skilled guidance.    Compliance with HEP:  excellent      Assessment of current progress against goals:    progressing    Rehab potential:  good     GOALS:  Pt stated goal:  movement     Short term goals:  + Increase R shoudler ROM by 15  degrees all motions (met)  + Reduce pain by 2 points (Improving)  + Independent in a basic HEP  ( MET)     Long term goals:  + reduce pain to < 4/10  (Improving)  + Full AROM in R shoulders as needed for " reaching over head(Improving)  + 5/5 strength in R shoulders and scapular stabilizers for stability during functional reach and lifting  + Improved posture and independent postural correction for reduced pain and proper biomechanics for optimal shoulder function(Improving)  + Independent in a HEP for self-management of symptoms.(Improving)  + Reduce soft tissue restrictions for normal biomechanics and function(Improving)  + Improve function with reduced reported disability by 50% on ASES  + Able to bathe and dress using the R UE without increased pain(Improving)  + Able to reach and lift as allowed by protocol without increased pain or compensation (Improving)     PLAN  Will decrease to 1x/week until follow up with doctor and able to progress to strengthening exercise,   Skilled physical therapy  for 12 weeks  Treatment may include:  Therapeutic Exercise (31903)  Therapeutic Activity (13971)  Manual therapy (76148)  Neuro-muscular re-education (79075)  Ultrasound (10047)  Unattended Electrical Stimulation (/39143)  Light therapy (26827)

## 2025-08-06 ENCOUNTER — DOCUMENTATION (OUTPATIENT)
Dept: PHYSICAL THERAPY | Facility: CLINIC | Age: 70
End: 2025-08-06

## 2025-08-06 ENCOUNTER — TELEPHONE (OUTPATIENT)
Dept: PRIMARY CARE | Facility: CLINIC | Age: 70
End: 2025-08-06

## 2025-08-06 ENCOUNTER — TREATMENT (OUTPATIENT)
Dept: PHYSICAL THERAPY | Facility: CLINIC | Age: 70
End: 2025-08-06
Payer: MEDICARE

## 2025-08-06 DIAGNOSIS — G47.00 INSOMNIA, UNSPECIFIED: ICD-10-CM

## 2025-08-06 DIAGNOSIS — M75.121 COMPLETE TEAR OF RIGHT ROTATOR CUFF, UNSPECIFIED WHETHER TRAUMATIC: ICD-10-CM

## 2025-08-06 DIAGNOSIS — I10 HYPERTENSION, UNSPECIFIED TYPE: ICD-10-CM

## 2025-08-06 PROCEDURE — 97140 MANUAL THERAPY 1/> REGIONS: CPT | Mod: GP | Performed by: PHYSICAL THERAPIST

## 2025-08-06 PROCEDURE — 97110 THERAPEUTIC EXERCISES: CPT | Mod: GP | Performed by: PHYSICAL THERAPIST

## 2025-08-06 RX ORDER — METOPROLOL TARTRATE 100 MG/1
100 TABLET ORAL 2 TIMES DAILY
Qty: 180 TABLET | Refills: 3 | Status: SHIPPED | OUTPATIENT
Start: 2025-08-06

## 2025-08-06 RX ORDER — ZOLPIDEM TARTRATE 10 MG/1
10 TABLET ORAL NIGHTLY
Qty: 30 TABLET | Refills: 0 | Status: SHIPPED | OUTPATIENT
Start: 2025-08-06

## 2025-08-06 NOTE — TELEPHONE ENCOUNTER
Rx request  Zolpidem   Metoprolol 100mg   Pt takes 1 in am and 1 pm.  90 day supply   [facility]

## 2025-08-06 NOTE — PROGRESS NOTES
"Physical Therapy Treatment   and Re-assessment   Patient Name: Kamari Zhao  MRN: [id number]  Today's Date: 8/6/2025  Time Calculation  Start Time: 1015  Stop Time: 1115  Time Calculation (min): 60 min       PT Therapeutic Procedures Time Entry  Therapeutic Exercise Time Entry: 40  Manual Therapy Time Entry: 15    INSURANCE:   5/21/2025     Visit Number: 18  VISITS APPROVED   24  Approval dates   05/14/2025 - 08/06/2025  Re-assessment on vist 7 on 6/5/25 and on visit 14 on 7/7/25 2025 [facility] MEDICARE: EVAL ONLY/ $25 COPAY/ 100% COVERAGE/ [place] V[place]ITS/ $3900 OOP (NM) PER [facility] SITE REF# [id number]  NOTE: 3 VISITS USED FOR 2025 TO DATE    Insurance Type: Payor: [employer] MEDICARE / Plan: [employer] MEDICARE / Product Type: *No Product type* /  CMS Re-Certification Period:5/14/25 to 8/12/25    Current Problem  1. Complete tear of right rotator cuff, unspecified whether traumatic  Follow Up In Physical Therapy          PRECAUTIONS:  Surgical:  no restrictions   Medical:   Cardiac  Fall risk:    low       PMH: (pertinent to PT evaluation)  ASHD, hypertension, CABG x 3, DM, anxiety, chronic right shoulder pain, complete rotator cuff tear, biceps tendinitis right shoulder, impingement right shoulder, labral tear right shoulder  *See Epic EMR for complete list.    Medical History Form: Reviewed (scanned into chart)    Surgery: 4/29/25    PAIN  Start of Treatment: 2-3/10 with movement/exercise  Worst was 3-4/10  Best 0/10 at rest      Other Measures  Other Outcome Measures: ASES R      8     SUBJECTIVE:   The pt  has been sore for about 2 weeks.  He  hasn't gotten the  pop in the front of the shoulder when he brings the arm across in front of him, since treatment on Monday.   \"It feels tight\"     He is doing his stretching every day and strengthening 3 times at home.      Still challenging - picking anything up, which greatly limits function and hobbies.  Anything quick.  Coming down is as hard as going up. "   Easier with gentle motions         OBJECTIVE   POSTURE:  Reduction in Forward head, Rounded  and elevated shoulders     SKIN  Irrtiation in ta axilly     SHOULDER:           P ROM         (in degrees)                                                             R   5/14/25         R   6/5/25          R    7/7/25     in supine      R 7/7/25            standing with wand            R  8/6/25  supine R  8/6/25  Standing with wand   Flexion                 38           130          140     150  160 140   Abduction     ~10          76          105       151 128 152   ER at neutral                   Lacks neutral             25        50      50 55 60   IR at neutral    To body            To body     To body    To L2 55 To L1             Strength       R  8/6/25  MMT R  8/6/25  resistance   Flexion     4+ Free wt 2# x10   Abduction  4+ Free wt  1# x10   ER at neutral             4+   Cable cross 3# x10   IR at neutral  5  Cable cross 3# x10   lats    3# x10 each   Mid trap  3# x10 each         ELBOW/WRIST   ROM:       IN DEGREES            R   8/6/25  R   8/6/25   Flexion           5  Free wt   5#   Extension          5  Free wt   8#     PALPATION:    Tightness  and tenderness throughout the R UE including anterior shoulder,   biceps, and B UT.  mild spasm in distal biceps      TREATMENT:  Manual Therapy:   STM to R shoulder with focus on pecs insertion, biceps  PROM to the R shoulder  with gentle oscillations    Therapeutic Exercise:   See objective measures which include exercise with resistance     Warm up  Pulleys 3 minutes - DNP  UBE  2 minutes forward and back       ======================================================================  Home exercise program: (HEP)  Exercise record with resistance and reps issued for pt to continue     Standing wand  *Chest press 1x10  hands close   *Flexion to 90 degrees 1x10   *ER 1x10  Supine wand   **Chest press   **Flexion   **ER  *Tick ilianaks   GARTH, H, CW, CCW, alphabet  PROM  "for elbow extension  PROM for R shoulder flexion, abduction, and ER - gentle  Table glides  for flexion, scaption, ER x10 each  =====================================================  Education/instruction:  New  Focus on good form/technique not weights or large motion   Prior:  Educated pt's wife in PROM for flexion and not to over stretch  Discussed nature of progress and not over-doing on painful times  Educated pt's wife in gentle massage  Discussed nerve entrapment  Recommended keeping a record of progress   current status, general goals, treatment plan   gentle motion   PROM   gentle manual to arm and neck   cold and safe use of each  Review of precautions  Table glides added to HEP  Progress  Shoulder anatomy  Don't push through pain  TENS use and parameters and electrode placement (chart given)  Progression  Self protraction with calsed hands in place of pendulums  Progression of exercise with cane AROM exercises  Progression of exercise with cane AROM exercises  to standing  Cold vs heat  Recommendations for TENS use.  Exercise modification due to recent re-injury  Added stretching \"**\" above  POC  Progress  Progression of exercises  Principles of strengthening with goal of 3x/week   Need to progress slowly.     PAIN  End of session pain ratin/10      ASSESSMENT: The patient has resumed therapy a break awaiting clearance to start strengthening exercises.  He has good MMT, but significant weakness with resistance exercise as is expected after surgery.  His normal level of function is very high and much greater strength is needed to reach his prior level of function.     This patient is appropriate for continued therapy, until the time he can perform strengthening exercises without skilled guidance.    Compliance with HEP:  excellent      Assessment of current progress against goals:    progressing    Rehab potential:  good     GOALS:  Pt stated goal:  movement     Short term goals:  + Increase R shoudler " ROM by 15  degrees all motions (met)  + Reduce pain by 2 points (MET)  + Independent in a basic HEP  ( MET)     Long term goals:  + reduce pain to < 4/10  (Improving)  + Full AROM in R shoulders as needed for reaching over head(Improving)  + 5/5 strength in R shoulders and scapular stabilizers for stability during functional reach and lifting(Improving)  + Improved posture and independent postural correction for reduced pain and proper biomechanics for optimal shoulder function(Improving)  + Independent in a HEP for self-management of symptoms.(Improving)  + Reduce soft tissue restrictions for normal biomechanics and function(Improving)  + Improve function with reduced reported disability by 50% on ASES  + Able to bathe and dress using the R UE without increased pain(Improving)  + Able to reach and lift as allowed by protocol without increased pain or compensation (Improving)     PLAN    Needs further insurance approval for date extension.     He would benefit from continued PT  1x/week for up to 6 sessions      Treatment may include:  Therapeutic Exercise (47992)  Therapeutic Activity (52368)  Manual therapy (00577)  Neuro-muscular re-education (92546)  Ultrasound (34331)  Unattended Electrical Stimulation (/76959)  Light therapy (32650)

## 2025-08-07 NOTE — PROGRESS NOTES
Physical Therapy  Discharge Summary    Name: Kamari Zhao  MRN: [id number]  : 1955  Date of DC: 25  Date of initial evaluation: 25    Functional Status at Discharge: See last note for most recent functional status    Rehab Discharge Reason: Progress plateaued    Discharge Plan: Home Program     Was this episode resolved in Epic: Yes

## 2025-08-21 ENCOUNTER — TREATMENT (OUTPATIENT)
Dept: PHYSICAL THERAPY | Facility: CLINIC | Age: 70
End: 2025-08-21
Payer: MEDICARE

## 2025-08-21 DIAGNOSIS — M75.121 COMPLETE TEAR OF RIGHT ROTATOR CUFF, UNSPECIFIED WHETHER TRAUMATIC: ICD-10-CM

## 2025-08-21 PROCEDURE — 97140 MANUAL THERAPY 1/> REGIONS: CPT | Mod: GP | Performed by: PHYSICAL THERAPIST

## 2025-08-21 PROCEDURE — 97014 ELECTRIC STIMULATION THERAPY: CPT | Mod: GP | Performed by: PHYSICAL THERAPIST

## 2025-08-25 ENCOUNTER — TREATMENT (OUTPATIENT)
Dept: PHYSICAL THERAPY | Facility: CLINIC | Age: 70
End: 2025-08-25
Payer: MEDICARE

## 2025-08-25 DIAGNOSIS — M75.121 COMPLETE TEAR OF RIGHT ROTATOR CUFF, UNSPECIFIED WHETHER TRAUMATIC: ICD-10-CM

## 2025-08-25 PROCEDURE — 97140 MANUAL THERAPY 1/> REGIONS: CPT | Mod: GP,CQ

## 2025-08-27 DIAGNOSIS — F41.9 ANXIETY AND DEPRESSION: ICD-10-CM

## 2025-08-27 DIAGNOSIS — F32.A ANXIETY AND DEPRESSION: ICD-10-CM

## 2025-08-28 RX ORDER — CLONAZEPAM 1 MG/1
1 TABLET ORAL 2 TIMES DAILY
Qty: 60 TABLET | Refills: 0 | Status: SHIPPED | OUTPATIENT
Start: 2025-08-28

## 2025-08-31 DIAGNOSIS — I10 BENIGN ESSENTIAL HTN: ICD-10-CM

## 2025-09-02 RX ORDER — HYDROCHLOROTHIAZIDE 50 MG/1
50 TABLET ORAL DAILY
Qty: 90 TABLET | Refills: 1 | Status: SHIPPED | OUTPATIENT
Start: 2025-09-02

## 2025-09-03 ENCOUNTER — TREATMENT (OUTPATIENT)
Dept: PHYSICAL THERAPY | Facility: CLINIC | Age: 70
End: 2025-09-03
Payer: MEDICARE

## 2025-09-03 DIAGNOSIS — M75.121 COMPLETE TEAR OF RIGHT ROTATOR CUFF, UNSPECIFIED WHETHER TRAUMATIC: ICD-10-CM

## 2025-09-03 DIAGNOSIS — G47.00 INSOMNIA, UNSPECIFIED: ICD-10-CM

## 2025-09-03 PROCEDURE — 97014 ELECTRIC STIMULATION THERAPY: CPT | Mod: GP | Performed by: PHYSICAL THERAPIST

## 2025-09-03 PROCEDURE — 97140 MANUAL THERAPY 1/> REGIONS: CPT | Mod: GP | Performed by: PHYSICAL THERAPIST

## 2025-09-03 RX ORDER — ZOLPIDEM TARTRATE 10 MG/1
10 TABLET ORAL NIGHTLY
Qty: 30 TABLET | Refills: 0 | Status: SHIPPED | OUTPATIENT
Start: 2025-09-03

## 2026-02-05 ENCOUNTER — APPOINTMENT (OUTPATIENT)
Dept: PRIMARY CARE | Facility: CLINIC | Age: 71
End: 2026-02-05
Payer: MEDICARE

## (undated) DEVICE — CANNULA, CLEAR, THREADED, 8.5 X 55MM

## (undated) DEVICE — KIT, BEACH CHAIR TRIMANO

## (undated) DEVICE — GLOVE, PROTEXIS PI CLASSIC, SZ-7.5, PF, LF

## (undated) DEVICE — GOWN, ASTOUND, XL

## (undated) DEVICE — APPLICATOR, CHLORAPREP, W/ORANGE TINT, 26ML

## (undated) DEVICE — STRIP, SKIN CLOSURE, STERI STRIP, REINFORCED, 0.5 X 4 IN

## (undated) DEVICE — DRAPE, SHEET, U, W/ADHESIVE STRIP, IMPERVIOUS, 60 X 70 IN, DISPOSABLE, LF, STERILE

## (undated) DEVICE — DRESSING, GAUZE, SPONGE, VERSALON, ALL PURPOSE, 4 X 4 IN, SOFT

## (undated) DEVICE — KIT, OPEN SHOULDER, CUSTOM, PORTAGE

## (undated) DEVICE — ELECTRODE, SUCTION, VAPR TRIPOLAR 90

## (undated) DEVICE — TUBING, NFLOW, FMS VUE, SNGL USE

## (undated) DEVICE — CANNULA, CLEAR, THREADED, 8.5 X 75MM

## (undated) DEVICE — SOLUTION, IRRIGATION, USP, SODIUM CHLORIDE 0.9%, 3000 ML

## (undated) DEVICE — DRAPE, SHEET, 17 X 23 IN

## (undated) DEVICE — COVER HANDLE LIGHT, STERIS, BLUE, STERILE

## (undated) DEVICE — PAD, GROUNDING, ELECTROSURGICAL, W/9 FT CABLE, POLYHESIVE II, ADULT, LF

## (undated) DEVICE — TUBING, OUTFLOW, DRAIN PIPE, W/ONE WAY VALVE (FMS VUE)

## (undated) DEVICE — DRAPE, SHEET, U, STERI DRAPE, 47 X 51 IN, DISPOSABLE, STERILE

## (undated) DEVICE — KIT, TENODESIS, DISP

## (undated) DEVICE — MASK, FACE, TENET, FOAM POSITIONING, DISPOSABLE

## (undated) DEVICE — IMPLANTABLE DEVICE
Type: IMPLANTABLE DEVICE | Site: SHOULDER | Status: NON-FUNCTIONAL
Brand: VERSALOOP™ ANCHOR 2 SUTURE, 2.5MM

## (undated) DEVICE — UC T-MAX

## (undated) DEVICE — REAMER, ACORN, 8MM

## (undated) DEVICE — SUTURE SYSTEM, EXPRESSEW III NEEDLES

## (undated) DEVICE — GLOVE, SURGICAL, PROTEXIS PI BLUE W/NEUTHERA, 8.0, PF, LF